# Patient Record
Sex: MALE | Race: WHITE | NOT HISPANIC OR LATINO | URBAN - METROPOLITAN AREA
[De-identification: names, ages, dates, MRNs, and addresses within clinical notes are randomized per-mention and may not be internally consistent; named-entity substitution may affect disease eponyms.]

---

## 2017-02-20 ENCOUNTER — OUTPATIENT (OUTPATIENT)
Dept: OUTPATIENT SERVICES | Facility: HOSPITAL | Age: 77
LOS: 1 days | End: 2017-02-20

## 2017-02-21 ENCOUNTER — OUTPATIENT (OUTPATIENT)
Dept: OUTPATIENT SERVICES | Facility: HOSPITAL | Age: 77
LOS: 1 days | End: 2017-02-21

## 2017-02-22 ENCOUNTER — OUTPATIENT (OUTPATIENT)
Dept: OUTPATIENT SERVICES | Facility: HOSPITAL | Age: 77
LOS: 1 days | End: 2017-02-22
Payer: MEDICARE

## 2017-02-22 PROCEDURE — 93971 EXTREMITY STUDY: CPT | Mod: 26,LT

## 2017-02-22 PROCEDURE — 71275 CT ANGIOGRAPHY CHEST: CPT | Mod: 26

## 2017-02-27 ENCOUNTER — APPOINTMENT (OUTPATIENT)
Dept: CARDIOLOGY | Facility: CLINIC | Age: 77
End: 2017-02-27

## 2017-02-27 PROBLEM — Z00.00 ENCOUNTER FOR PREVENTIVE HEALTH EXAMINATION: Status: ACTIVE | Noted: 2017-02-27

## 2017-03-20 ENCOUNTER — APPOINTMENT (OUTPATIENT)
Dept: CARDIOLOGY | Facility: CLINIC | Age: 77
End: 2017-03-20

## 2017-03-27 ENCOUNTER — APPOINTMENT (OUTPATIENT)
Dept: CARDIOLOGY | Facility: CLINIC | Age: 77
End: 2017-03-27

## 2017-03-28 ENCOUNTER — OUTPATIENT (OUTPATIENT)
Dept: OUTPATIENT SERVICES | Facility: HOSPITAL | Age: 77
LOS: 1 days | End: 2017-03-28

## 2017-06-01 ENCOUNTER — OUTPATIENT (OUTPATIENT)
Dept: OUTPATIENT SERVICES | Facility: HOSPITAL | Age: 77
LOS: 1 days | End: 2017-06-01

## 2017-09-26 ENCOUNTER — OUTPATIENT (OUTPATIENT)
Dept: OUTPATIENT SERVICES | Facility: HOSPITAL | Age: 77
LOS: 1 days | End: 2017-09-26

## 2017-10-02 ENCOUNTER — APPOINTMENT (OUTPATIENT)
Dept: CARDIOLOGY | Facility: CLINIC | Age: 77
End: 2017-10-02
Payer: MEDICARE

## 2017-10-02 PROCEDURE — 99214 OFFICE O/P EST MOD 30 MIN: CPT

## 2018-01-03 ENCOUNTER — OUTPATIENT (OUTPATIENT)
Dept: OUTPATIENT SERVICES | Facility: HOSPITAL | Age: 78
LOS: 1 days | End: 2018-01-03

## 2018-03-26 ENCOUNTER — OUTPATIENT (OUTPATIENT)
Dept: OUTPATIENT SERVICES | Facility: HOSPITAL | Age: 78
LOS: 1 days | End: 2018-03-26

## 2018-05-07 ENCOUNTER — OUTPATIENT (OUTPATIENT)
Dept: OUTPATIENT SERVICES | Facility: HOSPITAL | Age: 78
LOS: 1 days | End: 2018-05-07

## 2018-05-15 ENCOUNTER — RECORD ABSTRACTING (OUTPATIENT)
Age: 78
End: 2018-05-15

## 2018-05-21 ENCOUNTER — APPOINTMENT (OUTPATIENT)
Dept: CARDIOLOGY | Facility: CLINIC | Age: 78
End: 2018-05-21
Payer: MEDICARE

## 2018-05-21 ENCOUNTER — NON-APPOINTMENT (OUTPATIENT)
Age: 78
End: 2018-05-21

## 2018-05-21 VITALS
SYSTOLIC BLOOD PRESSURE: 120 MMHG | DIASTOLIC BLOOD PRESSURE: 68 MMHG | BODY MASS INDEX: 29.35 KG/M2 | HEIGHT: 67 IN | HEART RATE: 52 BPM | WEIGHT: 187 LBS | OXYGEN SATURATION: 97 %

## 2018-05-21 DIAGNOSIS — Z86.711 PERSONAL HISTORY OF PULMONARY EMBOLISM: ICD-10-CM

## 2018-05-21 DIAGNOSIS — Z82.3 FAMILY HISTORY OF STROKE: ICD-10-CM

## 2018-05-21 DIAGNOSIS — Z78.9 OTHER SPECIFIED HEALTH STATUS: ICD-10-CM

## 2018-05-21 DIAGNOSIS — Z87.898 PERSONAL HISTORY OF OTHER SPECIFIED CONDITIONS: ICD-10-CM

## 2018-05-21 DIAGNOSIS — Z82.49 FAMILY HISTORY OF ISCHEMIC HEART DISEASE AND OTHER DISEASES OF THE CIRCULATORY SYSTEM: ICD-10-CM

## 2018-05-21 DIAGNOSIS — E03.9 HYPOTHYROIDISM, UNSPECIFIED: ICD-10-CM

## 2018-05-21 DIAGNOSIS — Z87.19 PERSONAL HISTORY OF OTHER DISEASES OF THE DIGESTIVE SYSTEM: ICD-10-CM

## 2018-05-21 PROCEDURE — 93000 ELECTROCARDIOGRAM COMPLETE: CPT

## 2018-05-21 PROCEDURE — 99214 OFFICE O/P EST MOD 30 MIN: CPT

## 2018-05-21 RX ORDER — MENTHOL/CAMPHOR 0.5 %-0.5%
1000 LOTION (ML) TOPICAL
Refills: 0 | Status: ACTIVE | COMMUNITY

## 2018-06-14 ENCOUNTER — OUTPATIENT (OUTPATIENT)
Dept: OUTPATIENT SERVICES | Facility: HOSPITAL | Age: 78
LOS: 1 days | End: 2018-06-14

## 2018-06-27 ENCOUNTER — OUTPATIENT (OUTPATIENT)
Dept: OUTPATIENT SERVICES | Facility: HOSPITAL | Age: 78
LOS: 1 days | End: 2018-06-27

## 2018-07-19 ENCOUNTER — OUTPATIENT (OUTPATIENT)
Dept: OUTPATIENT SERVICES | Facility: HOSPITAL | Age: 78
LOS: 1 days | End: 2018-07-19

## 2018-07-24 ENCOUNTER — APPOINTMENT (OUTPATIENT)
Dept: CARDIOLOGY | Facility: CLINIC | Age: 78
End: 2018-07-24
Payer: MEDICARE

## 2018-07-24 PROCEDURE — 93979 VASCULAR STUDY: CPT

## 2018-07-24 PROCEDURE — 93306 TTE W/DOPPLER COMPLETE: CPT

## 2018-10-18 ENCOUNTER — OUTPATIENT (OUTPATIENT)
Dept: OUTPATIENT SERVICES | Facility: HOSPITAL | Age: 78
LOS: 1 days | End: 2018-10-18

## 2018-11-26 ENCOUNTER — APPOINTMENT (OUTPATIENT)
Dept: CARDIOLOGY | Facility: CLINIC | Age: 78
End: 2018-11-26
Payer: MEDICARE

## 2018-11-26 VITALS
SYSTOLIC BLOOD PRESSURE: 112 MMHG | DIASTOLIC BLOOD PRESSURE: 60 MMHG | HEIGHT: 67 IN | OXYGEN SATURATION: 98 % | WEIGHT: 182 LBS | HEART RATE: 62 BPM | BODY MASS INDEX: 28.56 KG/M2

## 2018-11-26 PROCEDURE — 99214 OFFICE O/P EST MOD 30 MIN: CPT

## 2018-11-26 RX ORDER — FINASTERIDE 5 MG/1
5 TABLET, FILM COATED ORAL DAILY
Refills: 0 | Status: ACTIVE | COMMUNITY

## 2018-12-27 ENCOUNTER — OUTPATIENT (OUTPATIENT)
Dept: OUTPATIENT SERVICES | Facility: HOSPITAL | Age: 78
LOS: 1 days | End: 2018-12-27

## 2019-04-10 ENCOUNTER — OUTPATIENT (OUTPATIENT)
Dept: OUTPATIENT SERVICES | Facility: HOSPITAL | Age: 79
LOS: 1 days | End: 2019-04-10

## 2019-05-16 ENCOUNTER — OUTPATIENT (OUTPATIENT)
Dept: OUTPATIENT SERVICES | Facility: HOSPITAL | Age: 79
LOS: 1 days | End: 2019-05-16

## 2019-07-15 ENCOUNTER — NON-APPOINTMENT (OUTPATIENT)
Age: 79
End: 2019-07-15

## 2019-07-15 ENCOUNTER — APPOINTMENT (OUTPATIENT)
Dept: CARDIOLOGY | Facility: CLINIC | Age: 79
End: 2019-07-15
Payer: MEDICARE

## 2019-07-15 VITALS
BODY MASS INDEX: 28.09 KG/M2 | HEART RATE: 57 BPM | OXYGEN SATURATION: 97 % | HEIGHT: 67 IN | SYSTOLIC BLOOD PRESSURE: 112 MMHG | WEIGHT: 179 LBS | DIASTOLIC BLOOD PRESSURE: 70 MMHG

## 2019-07-15 PROCEDURE — 93000 ELECTROCARDIOGRAM COMPLETE: CPT

## 2019-07-15 PROCEDURE — 99213 OFFICE O/P EST LOW 20 MIN: CPT

## 2019-07-15 RX ORDER — LEVOTHYROXINE SODIUM 88 UG/1
88 TABLET ORAL DAILY
Refills: 0 | Status: DISCONTINUED | COMMUNITY
End: 2019-07-15

## 2019-07-15 RX ORDER — DILTIAZEM HYDROCHLORIDE 120 MG/1
120 TABLET ORAL
Qty: 180 | Refills: 1 | Status: DISCONTINUED | COMMUNITY
End: 2019-07-15

## 2019-07-15 NOTE — PHYSICAL EXAM
[General Appearance - Well Developed] : well developed [Normal Appearance] : normal appearance [Well Groomed] : well groomed [General Appearance - Well Nourished] : well nourished [No Deformities] : no deformities [General Appearance - In No Acute Distress] : no acute distress [Normal Conjunctiva] : the conjunctiva exhibited no abnormalities [Eyelids - No Xanthelasma] : the eyelids demonstrated no xanthelasmas [Normal Oral Mucosa] : normal oral mucosa [No Oral Pallor] : no oral pallor [No Oral Cyanosis] : no oral cyanosis [Normal Jugular Venous A Waves Present] : normal jugular venous A waves present [Normal Jugular Venous V Waves Present] : normal jugular venous V waves present [No Jugular Venous Oliveira A Waves] : no jugular venous oliveira A waves [Respiration, Rhythm And Depth] : normal respiratory rhythm and effort [Exaggerated Use Of Accessory Muscles For Inspiration] : no accessory muscle use [Auscultation Breath Sounds / Voice Sounds] : lungs were clear to auscultation bilaterally [Heart Rate And Rhythm] : heart rate and rhythm were normal [Heart Sounds] : normal S1 and S2 [Abdomen Soft] : soft [Abdomen Tenderness] : non-tender [Abdomen Mass (___ Cm)] : no abdominal mass palpated [Abnormal Walk] : normal gait [Gait - Sufficient For Exercise Testing] : the gait was sufficient for exercise testing [Nail Clubbing] : no clubbing of the fingernails [Cyanosis, Localized] : no localized cyanosis [Petechial Hemorrhages (___cm)] : no petechial hemorrhages [Skin Color & Pigmentation] : normal skin color and pigmentation [] : no rash [No Venous Stasis] : no venous stasis [Skin Lesions] : no skin lesions [No Skin Ulcers] : no skin ulcer [No Xanthoma] : no  xanthoma was observed [Oriented To Time, Place, And Person] : oriented to person, place, and time [Affect] : the affect was normal [Mood] : the mood was normal [No Anxiety] : not feeling anxious [FreeTextEntry1] : 2/6 TOBY LSB

## 2019-07-15 NOTE — ASSESSMENT
[FreeTextEntry1] : Sina is a 79-year-old male with medical history detailed above and active medical issues including:\par \par -Atypical chest pain resolved, no ischemia Myoview stress test March 2017\par \par -Bileaflet mitral valve prolapse, mild MR, moderate pulmonary hypertension \par \par -HTN at goal less than 130/80. Baseline bradycardia, continue diltiazem to 120mg  twice per day with followup home blood pressure and heart rate.\par \par -Dyslipidemia on simvastatin well tolerated\par \par - LBBB since 2005. \par \par -Pulmonary embolus 2016 completed Xarelto therapy\par \par -Family history of premature vascular disease\par \par Patient will be seen in cardiology followup 6 months. \par \par Patient will have 2-D echocardiogram to assess for LV systolic function, wall motion and structural heart disease. Abdominal ultrasound to assess for obstructive PAD.\par \par Advised patient to follow active lifestyle with regular cardiovascular exercise. Patient educated on lifestyle and diet modification with low sodium low fat diet and avoidance of excessive alcohol. Patient is aware to call with any symptoms or concerns. \par \par Repeat labs ordered. Current cardiac medications remain unchanged.\par \par Sina will followup with Dr Cindy Fong for primary care\par \par

## 2019-07-15 NOTE — REASON FOR VISIT
[Follow-Up - Clinic] : a clinic follow-up of [FreeTextEntry2] : LBBB, PE 2016 completed Xarelto, HTN, dyslipidemia [FreeTextEntry1] : Sina is a 78-year-old male with history of chest pain, nonobstructive cath 2008, HTN, dyslipidemia, pulmonary embolus completed Xarelto October 2016, BPH, family history of stroke.\par \par Cardiovascular review of symptoms is negative for exertional chest pain, dyspnea, palpitations, dizziness or syncope.  No PND or orthopnea leg edema.  No bleeding or black stool.\par \par No routine exercise. At times he exercises on a glider machine without chest pain. Patient is walking 10 minutes without exertional chest pain\par \par Patient had a cardiology evaluation while in [place] in 2008 for chest pain, catheterization nonobstructive.\par \par Patient was seen in [facility]ology evaluation in 2005, normal perfusion a Myoview stress test no ischemic EKG response, baseline NSR PRWP\par \par EKG sinus bradycardia 51 bpm, LBBB unchanged.  Diltiazem reduced to 120mg twice per day with followup home heart rate and blood pressure.  \par \par Lexascan Myoview stress test March 2017, LVEF 56%, small anterior apical fixed defect likely related to left bundle branch block, no ischemia, nondiagnostic EKG response Baseline sinus rhythm LBBB, no anginal symptoms\par \par 2-D echo 2017, LVEF of 60%, mild diastolic dysfunction, bileaflet mitral valve prolapse, mild MR and TR, moderate pulmonary hypertension, PASP 51 mmHg, ascending aorta 4.1 cm.\par \par Carotid ultrasound March 2017 nonobstructive\par \par Abdominal ultrasound March 2017 proximal aorta 3.0 cm\par

## 2019-07-15 NOTE — REASON FOR VISIT
[Follow-Up - Clinic] : a clinic follow-up of [Abnormal ECG] : an abnormal ECG [Hyperlipidemia] : hyperlipidemia [Hypertension] : hypertension [FreeTextEntry2] : RUBÉN, PE 2016 completed Xarelto [FreeTextEntry1] : Sina is a 79-year-old male with history of chest pain, nonobstructive cath 2008, HTN, dyslipidemia, nonobstructive catheterization 2008, pulmonary embolus completed Xarelto October 2016, BPH, family history of stroke.\par \par Cardiovascular review of symptoms is negative for exertional chest pain, dyspnea, palpitations, dizziness or syncope.  No PND or orthopnea leg edema.  No bleeding or black stool.\par \par No routine exercise. At times he exercises on a glider machine without chest pain. Patient is walking 10 minutes without exertional chest pain\par \par Patient had a cardiology evaluation while in [place] in 2008 for chest pain, catheterization nonobstructive.\par \par Patient was seen in [facility]ology evaluation in 2005, normal perfusion a Myoview stress test no ischemic EKG response, baseline NSR PRWP\par \par EKG sinus bradycardia 51 bpm, LBBB unchanged.  Diltiazem reduced to 120mg twice per day with followup home heart rate and blood pressure.  \par \par Lexascan Myoview stress test March 2017, LVEF 56%, small anterior apical fixed defect likely related to left bundle branch block, no ischemia, nondiagnostic EKG response Baseline sinus rhythm LBBB, no anginal symptoms\par \par 2-D echo 2017, LVEF of 60%, mild diastolic dysfunction, bileaflet mitral valve prolapse, mild MR and TR, moderate pulmonary hypertension, PASP 51 mmHg, ascending aorta 4.1 cm.\par \par Carotid ultrasound March 2017 nonobstructive\par \par Abdominal ultrasound March 2017 proximal aorta 3.0 cm\par

## 2019-07-15 NOTE — ASSESSMENT
[FreeTextEntry1] : Sina is a 78-year-old male with medical history detailed above and active medical issues including:\par \par -Atypical chest pain resolved, no ischemia Myoview stress test March 2017\par \par -LBBB since 2005. Baseline bradycardia, reduce diltiazem to 120mg  twice per day with followup home blood pressure and heart rate.\par \par -Bileaflet mitral valve prolapse, mild MR, moderate pulmonary hypertension \par \par -HTN at goal less than 130/80\par \par -Dyslipidemia on simvastatin well tolerated\par \par -Pulmonary embolus 2016 completed Xarelto therapy\par \par -Family history of premature vascular disease\par \par Advised patient to follow active lifestyle with regular cardiovascular exercise. Patient educated on lifestyle and diet modification with low sodium low fat diet and avoidance of excessive alcohol. Patient is aware to call with any symptoms or concerns.\par \par Cardiology followup 6 months. Current cardiac medications remain unchanged. Repeat labs will be ordered with PMD.\par \par Sina will followup with Dr Noah Bello for primary care\par \par

## 2019-07-23 ENCOUNTER — APPOINTMENT (OUTPATIENT)
Dept: NUCLEAR MEDICINE | Facility: CLINIC | Age: 79
End: 2019-07-23

## 2019-07-24 ENCOUNTER — APPOINTMENT (OUTPATIENT)
Dept: NUCLEAR MEDICINE | Facility: CLINIC | Age: 79
End: 2019-07-24

## 2019-07-29 ENCOUNTER — OUTPATIENT (OUTPATIENT)
Dept: OUTPATIENT SERVICES | Facility: HOSPITAL | Age: 79
LOS: 1 days | End: 2019-07-29

## 2019-10-29 ENCOUNTER — OUTPATIENT (OUTPATIENT)
Dept: OUTPATIENT SERVICES | Facility: HOSPITAL | Age: 79
LOS: 1 days | End: 2019-10-29

## 2020-01-27 ENCOUNTER — APPOINTMENT (OUTPATIENT)
Dept: CARDIOLOGY | Facility: CLINIC | Age: 80
End: 2020-01-27
Payer: MEDICARE

## 2020-01-27 VITALS
SYSTOLIC BLOOD PRESSURE: 124 MMHG | OXYGEN SATURATION: 98 % | WEIGHT: 178 LBS | HEIGHT: 67 IN | BODY MASS INDEX: 27.94 KG/M2 | DIASTOLIC BLOOD PRESSURE: 74 MMHG | HEART RATE: 50 BPM

## 2020-01-27 PROCEDURE — 99214 OFFICE O/P EST MOD 30 MIN: CPT

## 2020-01-27 RX ORDER — OMEPRAZOLE 20 MG/1
20 CAPSULE, DELAYED RELEASE ORAL
Refills: 0 | Status: DISCONTINUED | COMMUNITY
End: 2020-01-27

## 2020-01-27 RX ORDER — DILTIAZEM HYDROCHLORIDE 120 MG/1
120 CAPSULE, EXTENDED RELEASE ORAL TWICE DAILY
Refills: 1 | Status: DISCONTINUED | COMMUNITY
End: 2020-01-27

## 2020-01-27 NOTE — ASSESSMENT
[FreeTextEntry1] : Sina is a 79-year-old male with medical history detailed above and active medical issues including:\par \par - Patient has dyspnea with moderate exertion, abnormal EKG LBBB, multiple CAD risk factors.  Patient declines performing current stress test. \par \par -Bileaflet mitral valve prolapse, mild MR, moderate pulmonary hypertension, normal LVEF echo July 2018.   \par \par - Hypertension, BP at goal with baseline bradycardia heart rate 50 on diltiazem.  Change diltiazem to amlodipine 5 mg daily, continue losartan HCTZ and follow-up home BPs.  \par \par -Dyslipidemia on simvastatin well tolerated\par \par - LBBB since 2005. \par \par -Pulmonary embolus 2016 completed Xarelto therapy\par \par -Family history of premature vascular disease\par \par Advised patient to follow active lifestyle with regular cardiovascular exercise. Patient educated on lifestyle and diet modification with low sodium low fat diet and avoidance of excessive alcohol. Patient is aware to call with any symptoms or concerns. \par \par Patient will have 2-D echocardiogram to assess for  LV systolic function, wall motion and  structural heart disease.  Carotid and abdominal ultrasound to assess for obstructive PAD. Patient will be called with noninvasive testing results. Patient will be seen in cardiology follow-up 6 months.  Repeat labs ordered. \par \par Sina will followup with Dr Cindy Fong for primary care\par \par

## 2020-01-27 NOTE — REASON FOR VISIT
[Follow-Up - Clinic] : a clinic follow-up of [FreeTextEntry2] : LBBB, HTN, HL, PE 2016 completed Xarelto [FreeTextEntry1] : Sina is a 79-year-old male with history of  HTN, dyslipidemia,  atypical chest pain, nonobstructive catheterization 2008, pulmonary embolus completed Xarelto October 2016, BPH, family history of stroke.\par \par Patient has dyspnea with moderate exertion.  Cardiovascular review of symptoms is negative for exertional chest pain, palpitations, dizziness or syncope.  No PND or orthopnea leg edema.  No bleeding or black stool.\par \par No routine exercise. Patient is walking 10 minutes without exertional chest pain. \par \par Patient had a cardiology evaluation while in [place] in 2008 for chest pain, catheterization nonobstructive.\par \par Patient was seen in [facility]ology evaluation in 2005, normal perfusion a Myoview stress test no ischemic EKG response, baseline NSR PRWP\par \par EKG sinus bradycardia 51 bpm, LBBB unchanged.  Diltiazem reduced to 120mg twice per day with followup home heart rate and blood pressure.  \par \par Lexascan Myoview stress test March 2017, LVEF 56%, small anterior apical fixed defect likely related to left bundle branch block, no ischemia, nondiagnostic EKG response Baseline sinus rhythm LBBB, no anginal symptoms\par \par 2-D echo 2017, LVEF of 60%, mild diastolic dysfunction, bileaflet mitral valve prolapse, mild MR and TR, moderate pulmonary hypertension, PASP 51 mmHg, ascending aorta 4.1 cm.\par \par Carotid ultrasound March 2017 nonobstructive\par \par Abdominal ultrasound March 2017 proximal aorta 3.0 cm\par

## 2020-02-03 ENCOUNTER — OUTPATIENT (OUTPATIENT)
Dept: OUTPATIENT SERVICES | Facility: HOSPITAL | Age: 80
LOS: 1 days | End: 2020-02-03

## 2020-02-03 ENCOUNTER — APPOINTMENT (OUTPATIENT)
Dept: CARDIOLOGY | Facility: CLINIC | Age: 80
End: 2020-02-03
Payer: MEDICARE

## 2020-02-03 VITALS
HEART RATE: 57 BPM | WEIGHT: 180 LBS | OXYGEN SATURATION: 98 % | SYSTOLIC BLOOD PRESSURE: 146 MMHG | BODY MASS INDEX: 27.28 KG/M2 | HEIGHT: 68 IN | DIASTOLIC BLOOD PRESSURE: 70 MMHG

## 2020-02-03 VITALS — HEART RATE: 52 BPM | SYSTOLIC BLOOD PRESSURE: 138 MMHG | DIASTOLIC BLOOD PRESSURE: 70 MMHG

## 2020-02-03 PROCEDURE — 99214 OFFICE O/P EST MOD 30 MIN: CPT

## 2020-02-03 RX ORDER — AMLODIPINE BESYLATE 2.5 MG/1
2.5 TABLET ORAL DAILY
Qty: 90 | Refills: 1 | Status: DISCONTINUED | COMMUNITY
Start: 2020-01-27 | End: 2020-02-03

## 2020-02-03 NOTE — REASON FOR VISIT
[Follow-Up - Clinic] : a clinic follow-up of [Medication Management] : Medication management [Hypertension] : hypertension [FreeTextEntry1] : Sina is a 79-year-old male with history of  HTN, dyslipidemia,  atypical chest pain, nonobstructive catheterization 2008, pulmonary embolus completed Xarelto October 2016, BPH, family history of stroke.\par \par Patient has dyspnea with moderate exertion.  Cardiovascular review of symptoms is negative for exertional chest pain, palpitations, dizziness or syncope.  No PND or orthopnea leg edema.  No bleeding or black stool.\par \par Patient had a cardiology evaluation while in [place] in 2008 for chest pain, catheterization nonobstructive.\par \par EKG sinus bradycardia 51 bpm, LBBB unchanged.  Diltiazem reduced to 120mg twice per day with followup home heart rate and blood pressure.  At last visit pt was taken off dilt and placed on amlodipine d/t asx bradycardia. He did not tolerate the change and felt extremely lightheaded. He discontinued the amlodipine and restarted the dilt. Symptoms resolved. On his home log, BP readings are well controlled. HR 52-60. \par \par Past testing for reference: \par Lexascan Myoview stress test March 2017, LVEF 56%, small anterior apical fixed defect likely related to left bundle branch block, no ischemia, nondiagnostic EKG response Baseline sinus rhythm LBBB, no anginal symptoms\par \par 2-D echo 7/2018, LVEF of 60%, mild diastolic dysfunction, bileaflet mitral valve prolapse, mild MR, normal PASP\par \par Carotid ultrasound March 2017 nonobstructive\par \par Abdominal ultrasound March 2017 proximal aorta 3.0 cm\par

## 2020-02-03 NOTE — PHYSICAL EXAM
[Normal Appearance] : normal appearance [Well Groomed] : well groomed [General Appearance - Well Developed] : well developed [General Appearance - Well Nourished] : well nourished [No Deformities] : no deformities [General Appearance - In No Acute Distress] : no acute distress [Normal Conjunctiva] : the conjunctiva exhibited no abnormalities [No Oral Pallor] : no oral pallor [Eyelids - No Xanthelasma] : the eyelids demonstrated no xanthelasmas [No Oral Cyanosis] : no oral cyanosis [Normal Jugular Venous V Waves Present] : normal jugular venous V waves present [Normal Jugular Venous A Waves Present] : normal jugular venous A waves present [No Jugular Venous Oliveira A Waves] : no jugular venous oliveira A waves [Respiration, Rhythm And Depth] : normal respiratory rhythm and effort [Auscultation Breath Sounds / Voice Sounds] : lungs were clear to auscultation bilaterally [Exaggerated Use Of Accessory Muscles For Inspiration] : no accessory muscle use [Heart Rate And Rhythm] : heart rate and rhythm were normal [Heart Sounds] : normal S1 and S2 [Abdomen Soft] : soft [Abdomen Tenderness] : non-tender [Abdomen Mass (___ Cm)] : no abdominal mass palpated [Gait - Sufficient For Exercise Testing] : the gait was sufficient for exercise testing [Abnormal Walk] : normal gait [Petechial Hemorrhages (___cm)] : no petechial hemorrhages [Nail Clubbing] : no clubbing of the fingernails [Cyanosis, Localized] : no localized cyanosis [Skin Color & Pigmentation] : normal skin color and pigmentation [No Venous Stasis] : no venous stasis [] : no rash [Skin Lesions] : no skin lesions [No Skin Ulcers] : no skin ulcer [Oriented To Time, Place, And Person] : oriented to person, place, and time [No Xanthoma] : no  xanthoma was observed [Affect] : the affect was normal [Mood] : the mood was normal [No Anxiety] : not feeling anxious [FreeTextEntry1] : 2/6 TOBY LSB

## 2020-02-03 NOTE — ASSESSMENT
[FreeTextEntry1] : Sina is a 79-year-old male with medical history detailed above and active medical issues including:\par \par - Patient has dyspnea with moderate exertion, abnormal EKG LBBB, multiple CAD risk factors. No chest pain.  Patient declines performing current stress test. \par \par -Bileaflet mitral valve prolapse, mild MR, normal LVEF echo July 2018.   \par \par - Hypertension, BP at goal with baseline bradycardia heart rate 50 on diltiazem. He did not tolerate attempt in change to amlodipine well. Advised 24h monitor to r/o significant bradyarrhythmias/pauses while on dilt. I validated his home cuff today with BP and HR are accurate. Continue to monitor and continue losartan HCTZ.\par \par -Dyslipidemia on simvastatin well tolerated\par \par - LBBB since 2005. \par \par -Pulmonary embolus 2016 completed Xarelto therapy\par \par -Family history of premature vascular disease\par \par Plan as discussed with Dr. Valentino on 1/27/20 is for routine noninvasive testing in the coming weeks. He will call with results and pt will return in 6 months unless otherwise indicated. Any questions and concerns were addressed and resolved. \par \par Sina will followup with Dr Cindy Fong for primary care\par \par Sincerely,\par \par [place] Person\par Patients history, testing, and plan reviewed with supervising MD: Dr. Bob Perales \par \par

## 2020-02-03 NOTE — REVIEW OF SYSTEMS
[Dyspnea on exertion] : dyspnea during exertion [see HPI] : see HPI [Negative] : Heme/Lymph [Lower Ext Edema] : no extremity edema [Chest  Pressure] : no chest pressure [Chest Pain] : no chest pain [Palpitations] : no palpitations [Leg Claudication] : no intermittent leg claudication

## 2020-02-06 PROCEDURE — 93224 XTRNL ECG REC UP TO 48 HRS: CPT

## 2020-02-11 ENCOUNTER — APPOINTMENT (OUTPATIENT)
Dept: CARDIOLOGY | Facility: CLINIC | Age: 80
End: 2020-02-11
Payer: MEDICARE

## 2020-02-11 PROCEDURE — 93306 TTE W/DOPPLER COMPLETE: CPT

## 2020-02-11 PROCEDURE — 93979 VASCULAR STUDY: CPT

## 2020-02-11 PROCEDURE — 93880 EXTRACRANIAL BILAT STUDY: CPT

## 2020-02-17 RX ORDER — DILTIAZEM HYDROCHLORIDE 120 MG/1
120 CAPSULE, EXTENDED RELEASE ORAL
Qty: 180 | Refills: 0 | Status: DISCONTINUED | COMMUNITY
End: 2020-02-17

## 2020-02-18 RX ORDER — DILTIAZEM HYDROCHLORIDE 120 MG/1
120 CAPSULE, EXTENDED RELEASE ORAL
Qty: 90 | Refills: 1 | Status: DISCONTINUED | COMMUNITY
Start: 2020-02-17 | End: 2020-02-18

## 2020-07-06 ENCOUNTER — APPOINTMENT (OUTPATIENT)
Dept: CARDIOLOGY | Facility: CLINIC | Age: 80
End: 2020-07-06
Payer: MEDICARE

## 2020-07-06 ENCOUNTER — NON-APPOINTMENT (OUTPATIENT)
Age: 80
End: 2020-07-06

## 2020-07-06 VITALS
HEIGHT: 67 IN | WEIGHT: 180 LBS | OXYGEN SATURATION: 97 % | SYSTOLIC BLOOD PRESSURE: 120 MMHG | HEART RATE: 54 BPM | BODY MASS INDEX: 28.25 KG/M2 | DIASTOLIC BLOOD PRESSURE: 70 MMHG | TEMPERATURE: 98.3 F

## 2020-07-06 PROCEDURE — 99214 OFFICE O/P EST MOD 30 MIN: CPT

## 2020-07-06 PROCEDURE — 93000 ELECTROCARDIOGRAM COMPLETE: CPT

## 2020-07-06 NOTE — ASSESSMENT
[FreeTextEntry1] : Sina is a 80-year-old male with medical history detailed above and active medical issues including:\par \par - Patient has dyspnea with moderate exertion, abnormal EKG LBBB, multiple CAD risk factors.  In the setting of Covid 19 pandemic we will discuss the need for stress testing next office visit. \par \par - Bileaflet mitral valve prolapse, mild MR, moderate pulmonary hypertension, normal LVEF echo July 2018.   \par \par - Hypertension, BP at goal with baseline bradycardia heart rate 50 on diltiazem.  Change diltiazem to amlodipine 5 mg daily, continue losartan HCTZ and follow-up home BPs.  \par \par - Dyslipidemia on simvastatin well tolerated\par \par - LBBB since 2005. \par \par - Pulmonary embolus 2016 completed Xarelto therapy\par \par - Family history of premature vascular disease\par \par Advised patient to follow active lifestyle with regular cardiovascular exercise. Patient educated on lifestyle and diet modification with low sodium low fat diet and avoidance of excessive alcohol. Patient is aware to call with any symptoms or concerns. \par \par Cardiology follow-up 6 months with 2-D echocardiogram to assess for  LV systolic function, wall motion and  structural heart disease.  Current cardiac medications remain unchanged. Repeat labs will be ordered with PMD.\par \par Sina will followup with Dr Cindy Fong for primary care\par \par

## 2020-07-06 NOTE — PHYSICAL EXAM
[General Appearance - Well Developed] : well developed [Normal Appearance] : normal appearance [Well Groomed] : well groomed [General Appearance - Well Nourished] : well nourished [No Deformities] : no deformities [General Appearance - In No Acute Distress] : no acute distress [Normal Conjunctiva] : the conjunctiva exhibited no abnormalities [Eyelids - No Xanthelasma] : the eyelids demonstrated no xanthelasmas [Normal Oral Mucosa] : normal oral mucosa [No Oral Pallor] : no oral pallor [No Oral Cyanosis] : no oral cyanosis [Normal Jugular Venous A Waves Present] : normal jugular venous A waves present [Normal Jugular Venous V Waves Present] : normal jugular venous V waves present [No Jugular Venous Oliveira A Waves] : no jugular venous oliveira A waves [Respiration, Rhythm And Depth] : normal respiratory rhythm and effort [Exaggerated Use Of Accessory Muscles For Inspiration] : no accessory muscle use [Auscultation Breath Sounds / Voice Sounds] : lungs were clear to auscultation bilaterally [Heart Rate And Rhythm] : heart rate and rhythm were normal [Heart Sounds] : normal S1 and S2 [Abdomen Soft] : soft [Abdomen Tenderness] : non-tender [Abdomen Mass (___ Cm)] : no abdominal mass palpated [Abnormal Walk] : normal gait [Gait - Sufficient For Exercise Testing] : the gait was sufficient for exercise testing [Nail Clubbing] : no clubbing of the fingernails [Cyanosis, Localized] : no localized cyanosis [Petechial Hemorrhages (___cm)] : no petechial hemorrhages [Skin Color & Pigmentation] : normal skin color and pigmentation [] : no rash [No Venous Stasis] : no venous stasis [Skin Lesions] : no skin lesions [No Skin Ulcers] : no skin ulcer [Oriented To Time, Place, And Person] : oriented to person, place, and time [No Xanthoma] : no  xanthoma was observed [Mood] : the mood was normal [No Anxiety] : not feeling anxious [Affect] : the affect was normal [FreeTextEntry1] : 2/6 TOBY LSB

## 2020-07-06 NOTE — REASON FOR VISIT
[Follow-Up - Clinic] : a clinic follow-up of [FreeTextEntry2] : LBBB, HTN, HL, PE 2016 completed Xarelto [FreeTextEntry1] : Sina is a 80-year-old male with history of  HTN, dyslipidemia, LBBB, atypical chest pain, nonobstructive catheterization 2008, pulmonary embolus completed Xarelto October 2016, BPH, family history of stroke.\par \par Patient has dyspnea with moderate exertion.  Cardiovascular review of symptoms is negative for exertional chest pain, palpitations, dizziness or syncope.  No PND or orthopnea leg edema.  No bleeding or black stool.\par \par No routine exercise. Patient is walking 15 minutes without exertional chest pain.  Patient is active gardening and mowing his lawn. \par \par Patient had a cardiology evaluation while in [place] in 2008 for chest pain, catheterization nonobstructive.\par \par Echocardiogram February 2020, LVEF 60%, mild diastolic dysfunction, bileaflet mitral valve prolapse with difficult to quantitate eccentric MR, mild TR, normal RVSP\par \par Carotid and abdominal ultrasound February 2020, mild nonobstructive plaque, normal abdominal aortic size\par \par Patient was seen in [facility]ology evaluation in 2005, normal perfusion a Myoview stress test no ischemic EKG response, baseline NSR PRWP\par \par EKG sinus bradycardia 51 bpm, LBBB unchanged.  Diltiazem reduced to 120mg twice per day with followup home heart rate and blood pressure.  \par \par Lexascan Myoview stress test March 2017, LVEF 56%, small anterior apical fixed defect likely related to left bundle branch block, no ischemia, nondiagnostic EKG response Baseline sinus rhythm LBBB, no anginal symptoms\par \par 2-D echo 2017, LVEF of 60%, mild diastolic dysfunction, bileaflet mitral valve prolapse, mild MR and TR, moderate pulmonary hypertension, PASP 51 mmHg, ascending aorta 4.1 cm.\par \par Carotid ultrasound March 2017 nonobstructive\par \par Abdominal ultrasound March 2017 proximal aorta 3.0 cm\par

## 2020-07-06 NOTE — REVIEW OF SYSTEMS
[Dyspnea on exertion] : dyspnea during exertion [Negative] : Heme/Lymph [Chest Pain] : no chest pain [Chest  Pressure] : no chest pressure [Lower Ext Edema] : no extremity edema [Leg Claudication] : no intermittent leg claudication [Palpitations] : no palpitations

## 2021-01-28 ENCOUNTER — OUTPATIENT (OUTPATIENT)
Dept: OUTPATIENT SERVICES | Facility: HOSPITAL | Age: 81
LOS: 1 days | End: 2021-01-28

## 2021-10-18 ENCOUNTER — NON-APPOINTMENT (OUTPATIENT)
Age: 81
End: 2021-10-18

## 2021-10-18 ENCOUNTER — APPOINTMENT (OUTPATIENT)
Dept: CARDIOLOGY | Facility: CLINIC | Age: 81
End: 2021-10-18
Payer: MEDICARE

## 2021-10-18 VITALS
WEIGHT: 178 LBS | HEART RATE: 57 BPM | DIASTOLIC BLOOD PRESSURE: 70 MMHG | SYSTOLIC BLOOD PRESSURE: 122 MMHG | HEIGHT: 67 IN | TEMPERATURE: 98.2 F | OXYGEN SATURATION: 98 % | BODY MASS INDEX: 27.94 KG/M2

## 2021-10-18 PROCEDURE — 99214 OFFICE O/P EST MOD 30 MIN: CPT

## 2021-10-18 PROCEDURE — 93000 ELECTROCARDIOGRAM COMPLETE: CPT

## 2021-10-18 RX ORDER — LEVOTHYROXINE SODIUM 0.09 MG/1
88 TABLET ORAL DAILY
Refills: 0 | Status: DISCONTINUED | COMMUNITY
End: 2021-10-18

## 2021-10-18 RX ORDER — LEVOTHYROXINE SODIUM 0.1 MG/1
100 TABLET ORAL DAILY
Refills: 0 | Status: ACTIVE | COMMUNITY

## 2021-10-18 NOTE — PHYSICAL EXAM
[General Appearance - Well Developed] : well developed [Normal Appearance] : normal appearance [Well Groomed] : well groomed [General Appearance - Well Nourished] : well nourished [No Deformities] : no deformities [General Appearance - In No Acute Distress] : no acute distress [Eyelids - No Xanthelasma] : the eyelids demonstrated no xanthelasmas [Normal Oral Mucosa] : normal oral mucosa [No Oral Pallor] : no oral pallor [No Oral Cyanosis] : no oral cyanosis [Normal Jugular Venous A Waves Present] : normal jugular venous A waves present [Normal Jugular Venous V Waves Present] : normal jugular venous V waves present [No Jugular Venous Oliveira A Waves] : no jugular venous oliveira A waves [Respiration, Rhythm And Depth] : normal respiratory rhythm and effort [Exaggerated Use Of Accessory Muscles For Inspiration] : no accessory muscle use [Auscultation Breath Sounds / Voice Sounds] : lungs were clear to auscultation bilaterally [Heart Rate And Rhythm] : heart rate and rhythm were normal [Heart Sounds] : normal S1 and S2 [Abdomen Soft] : soft [Abdomen Tenderness] : non-tender [Abdomen Mass (___ Cm)] : no abdominal mass palpated [Abnormal Walk] : normal gait [Gait - Sufficient For Exercise Testing] : the gait was sufficient for exercise testing [Nail Clubbing] : no clubbing of the fingernails [Cyanosis, Localized] : no localized cyanosis [Petechial Hemorrhages (___cm)] : no petechial hemorrhages [Skin Color & Pigmentation] : normal skin color and pigmentation [] : no rash [No Venous Stasis] : no venous stasis [Skin Lesions] : no skin lesions [No Skin Ulcers] : no skin ulcer [No Xanthoma] : no  xanthoma was observed [Oriented To Time, Place, And Person] : oriented to person, place, and time [Affect] : the affect was normal [Mood] : the mood was normal [No Anxiety] : not feeling anxious [Well Developed] : well developed [Well Nourished] : well nourished [No Acute Distress] : no acute distress [Normal Conjunctiva] : normal conjunctiva [Normal Venous Pressure] : normal venous pressure [No Carotid Bruit] : no carotid bruit [Normal S1, S2] : normal S1, S2 [No Rub] : no rub [No Gallop] : no gallop [Clear Lung Fields] : clear lung fields [Good Air Entry] : good air entry [No Respiratory Distress] : no respiratory distress  [Soft] : abdomen soft [Non Tender] : non-tender [No Masses/organomegaly] : no masses/organomegaly [Normal Bowel Sounds] : normal bowel sounds [Normal Gait] : normal gait [No Edema] : no edema [No Cyanosis] : no cyanosis [No Clubbing] : no clubbing [No Varicosities] : no varicosities [No Rash] : no rash [No Skin Lesions] : no skin lesions [Moves all extremities] : moves all extremities [No Focal Deficits] : no focal deficits [Normal Speech] : normal speech [Alert and Oriented] : alert and oriented [Normal memory] : normal memory [de-identified] : 2/6 TOBY of MR [FreeTextEntry1] : 2/6 TOBY LSB

## 2021-10-18 NOTE — REASON FOR VISIT
[Follow-Up - Clinic] : a clinic follow-up of [Other: ____] : [unfilled] [FreeTextEntry1] : Sina is a 81-year-old male with history of  HTN, dyslipidemia, LBBB, atypical chest pain, nonobstructive catheterization 2008, pulmonary embolus completed Xarelto October 2016, BPH, family history of stroke.\par \par Patient has dyspnea with moderate exertion.  Cardiovascular review of symptoms is negative for exertional chest pain, palpitations, dizziness or syncope.  No PND or orthopnea leg edema.  No bleeding or black stool.\par \par No routine exercise. Patient is walking 15 minutes without exertional chest pain.  Patient is active gardening and mowing his lawn.  Patient declines performing a current stress test.\par \par Patient had a cardiology evaluation while in [place] in 2008 for chest pain, catheterization nonobstructive.\par \par Echocardiogram February 2020, LVEF 60%, mild diastolic dysfunction, bileaflet mitral valve prolapse with difficult to quantitate eccentric MR, mild TR, normal RVSP\par \par Carotid and abdominal ultrasound February 2020, mild nonobstructive plaque, normal abdominal aortic size\par \par Patient was seen in [facility] evaluation in [facility], normal perfusion a Myoview stress test no ischemic EKG response, baseline NSR PRWP\par \par EKG sinus bradycardia 51 bpm, LBBB unchanged.  Diltiazem reduced to 120mg twice per day with followup home heart rate and blood pressure.  \par \par Lexascan Myoview stress test March 2017, LVEF 56%, small anterior apical fixed defect likely related to left bundle branch block, no ischemia, nondiagnostic EKG response Baseline sinus rhythm LBBB, no anginal symptoms\par \par 2-D echo 2017, LVEF of 60%, mild diastolic dysfunction, bileaflet mitral valve prolapse, mild MR and TR, moderate pulmonary hypertension, PASP 51 mmHg, ascending aorta 4.1 cm.\par \par Carotid ultrasound March 2017 nonobstructive\par \par Abdominal ultrasound March 2017 proximal aorta 3.0 cm\par  [FreeTextEntry2] : LBBB, HTN, HL, PE 2016 completed Xarelto

## 2021-11-22 ENCOUNTER — APPOINTMENT (OUTPATIENT)
Dept: CARDIOLOGY | Facility: CLINIC | Age: 81
End: 2021-11-22
Payer: MEDICARE

## 2021-11-22 PROCEDURE — 93306 TTE W/DOPPLER COMPLETE: CPT

## 2021-11-22 PROCEDURE — 93880 EXTRACRANIAL BILAT STUDY: CPT

## 2021-11-22 PROCEDURE — 93979 VASCULAR STUDY: CPT

## 2022-08-27 ENCOUNTER — TRANSCRIPTION ENCOUNTER (OUTPATIENT)
Age: 82
End: 2022-08-27

## 2022-08-29 ENCOUNTER — APPOINTMENT (OUTPATIENT)
Dept: DISASTER EMERGENCY | Facility: HOSPITAL | Age: 82
End: 2022-08-29

## 2022-08-29 ENCOUNTER — OUTPATIENT (OUTPATIENT)
Dept: OUTPATIENT SERVICES | Facility: HOSPITAL | Age: 82
LOS: 1 days | End: 2022-08-29

## 2022-08-29 VITALS
RESPIRATION RATE: 16 BRPM | DIASTOLIC BLOOD PRESSURE: 65 MMHG | HEART RATE: 77 BPM | SYSTOLIC BLOOD PRESSURE: 100 MMHG | OXYGEN SATURATION: 98 % | TEMPERATURE: 98 F

## 2022-08-29 VITALS
TEMPERATURE: 98 F | OXYGEN SATURATION: 97 % | SYSTOLIC BLOOD PRESSURE: 144 MMHG | HEIGHT: 67 IN | WEIGHT: 179.9 LBS | RESPIRATION RATE: 16 BRPM | DIASTOLIC BLOOD PRESSURE: 68 MMHG | HEART RATE: 74 BPM

## 2022-08-29 DIAGNOSIS — U07.1 COVID-19: ICD-10-CM

## 2022-08-29 RX ORDER — BEBTELOVIMAB 87.5 MG/ML
175 INJECTION, SOLUTION INTRAVENOUS ONCE
Refills: 0 | Status: COMPLETED | OUTPATIENT
Start: 2022-08-29 | End: 2022-08-29

## 2022-08-29 RX ADMIN — BEBTELOVIMAB 175 MILLIGRAM(S): 87.5 INJECTION, SOLUTION INTRAVENOUS at 08:55

## 2022-08-29 NOTE — CHART NOTE - NSCHARTNOTEFT_GEN_A_CORE
CC: Monoclonal Antibody Administration/COVID 19 Positive      History: Patient presents for administration of monoclonal antibody  Patient has been screened and was deemed to be a candidate.    Exposure: Daughter resides in home COVID +     Symptoms/ Criteria: fever sore throat cough     Inclusion Criteria: Pulmonary embolus age > 80 years  DM    Date of Positive Test: verified by clinical call center     Date of symptom onset: 8/26    Vaccine status:  Moderna x 4  last  6/22    PMHx:  Infection due to severe acute respiratory syndrome coronavirus 2 (SARS-CoV-2)          T(C): 36.7 (08-29-22 @ 09:08), Max: 36.7 (08-29-22 @ 09:08)  HR: 62 (08-29-22 @ 09:08) (62 - 74)  BP: 100/63 (08-29-22 @ 09:08) (100/63 - 144/68)  RR: 18 (08-29-22 @ 09:08) (16 - 18)  SpO2: 96% (08-29-22 @ 09:08) (96% - 97%)      PE:   Appearance: NAD	  HEENT:   Normal oral mucosa.   Lymphatic: No lymphadenopathy  Cardiovascular: Normal S1 S2, No JVD, No murmurs, No edema  Respiratory: Lungs clear to auscultation	  Gastrointestinal:  Soft, Non-tender. No guarding   Skin: warm and dry  Neurologic: Non-focal  Extremities: Normal range of motion.     ASSESSMENT:  Pt is a 82y year old Male with PMH  Pulmonary embolus HTN   DM   Covid +  referred to the infusion center for Monoclonal antibody administration  (Bebtelovimab).        PLAN:  - Administration procedure explained to patient   - Consent for monoclonal antibody administration obtained   - Risk & benefits discussed/all questions answered  - Administer Bebtelovimab per protocol  - will observe patient for one hour post administration  and then if stable discharge home with outpt follow up as planned by PMD.        - Patient tolerated treatment well denies complaints of chest pain/SOB/dizziness/ palpitations  - VSS for discharge home  - D/C instructions given/ fact sheet included.  - Patient to follow-up with PCP as needed.

## 2022-10-18 ENCOUNTER — APPOINTMENT (OUTPATIENT)
Dept: CARDIOLOGY | Facility: CLINIC | Age: 82
End: 2022-10-18

## 2022-10-18 ENCOUNTER — NON-APPOINTMENT (OUTPATIENT)
Age: 82
End: 2022-10-18

## 2022-10-18 VITALS
HEIGHT: 67 IN | HEART RATE: 60 BPM | OXYGEN SATURATION: 95 % | WEIGHT: 180 LBS | TEMPERATURE: 97.3 F | SYSTOLIC BLOOD PRESSURE: 124 MMHG | DIASTOLIC BLOOD PRESSURE: 68 MMHG | BODY MASS INDEX: 28.25 KG/M2

## 2022-10-18 PROCEDURE — 93000 ELECTROCARDIOGRAM COMPLETE: CPT

## 2022-10-18 PROCEDURE — 99215 OFFICE O/P EST HI 40 MIN: CPT

## 2022-10-18 NOTE — ASSESSMENT
[FreeTextEntry1] : Sina is a 80-year-old male with medical history detailed above and active medical issues including:\par \par - Recurrent chest pain concerning for angina, multiple CAD risk factors, coronary CTA and coronary calcium score ordered to access for obstructive CAD and risk stradification.\par \par - Bileaflet mitral valve prolapse, mild MR, moderate pulmonary hypertension, normal LVEF echo Nov 2021\par \par - Hypertension, average resting home BPs at guideline goal on diltiazem, losartan HCTZ.  \par \par - Dyslipidemia on simvastatin well tolerated\par \par - LBBB since 2005. \par \par - Pulmonary embolus 2016 completed Xarelto therapy\par \par - Family history of premature vascular disease\par \par Advised patient to follow active lifestyle with regular cardiovascular exercise. Patient educated on lifestyle and diet modification with low sodium low fat diet and avoidance of excessive alcohol. Patient is aware to call with any symptoms or concerns. \par \par Cardiology follow-up after noninvasive testing.  Current cardiac medications remain unchanged. Repeat labs will be ordered with PMD.\par \par Sina will followup with Dr Cindy Fong for primary care. \par \par

## 2022-10-18 NOTE — REASON FOR VISIT
[Other: ____] : [unfilled] [Follow-Up - Clinic] : a clinic follow-up of [FreeTextEntry1] : Sina is a 82-year-old male with history of  HTN, dyslipidemia, LBBB, atypical chest pain, nonobstructive catheterization 2008, pulmonary embolus completed Xarelto October 2016, BPH, family history of stroke.\par \par Patient has recurrent chest pain occurring at random times rest and exertion mild intensity nonradiating nonreproducible. Patient has dyspnea with moderate exertion.  Cardiovascular review of symptoms is negative for palpitations, dizziness or syncope.  No PND or orthopnea leg edema.  No bleeding or black stool.\par \par No routine exercise. Patient is walking 15 minutes on occasion.  Patient is active gardening and mowing his lawn.  \par \par Patient had a cardiology evaluation while in [place] in 2008 for chest pain, catheterization nonobstructive.\par \par Echocardiogram February 2020, LVEF 60%, mild diastolic dysfunction, bileaflet mitral valve prolapse with difficult to quantitate eccentric MR, mild TR, normal RVSP\par \par Carotid and abdominal ultrasound February 2020, mild nonobstructive plaque, normal abdominal aortic size\par \par Patient was seen in [facility] evaluation in 2005, normal perfusion a Myoview stress test no ischemic EKG response, baseline NSR PRWP\par \par EKG sinus bradycardia 51 bpm, LBBB unchanged.  Diltiazem reduced to 120mg twice per day with followup home heart rate and blood pressure.  \par \par Lexascan Myoview stress test March 2017, LVEF 56%, small anterior apical fixed defect likely related to left bundle branch block, no ischemia, nondiagnostic EKG response Baseline sinus rhythm LBBB, no anginal symptoms\par \par 2-D echo 2017, LVEF of 60%, mild diastolic dysfunction, bileaflet mitral valve prolapse, mild MR and TR, moderate pulmonary hypertension, PASP 51 mmHg, ascending aorta 4.1 cm.\par \par Carotid ultrasound March 2017 nonobstructive\par \par Abdominal ultrasound March 2017 proximal aorta 3.0 cm\par  [FreeTextEntry2] : LBBB, HTN, HL, PE 2016 completed Xarelto

## 2022-10-21 ENCOUNTER — APPOINTMENT (OUTPATIENT)
Dept: CARDIOLOGY | Facility: CLINIC | Age: 82
End: 2022-10-21

## 2022-10-21 PROCEDURE — 93306 TTE W/DOPPLER COMPLETE: CPT

## 2022-10-25 ENCOUNTER — NON-APPOINTMENT (OUTPATIENT)
Age: 82
End: 2022-10-25

## 2022-10-31 ENCOUNTER — APPOINTMENT (OUTPATIENT)
Dept: CARDIOLOGY | Facility: CLINIC | Age: 82
End: 2022-10-31

## 2022-10-31 PROCEDURE — 99214 OFFICE O/P EST MOD 30 MIN: CPT | Mod: 95

## 2022-10-31 NOTE — REASON FOR VISIT
[Other: ____] : [unfilled] [Follow-Up - Clinic] : a clinic follow-up of [FreeTextEntry1] : Sina is a 82-year-old male with history of  HTN, dyslipidemia, LBBB, atypical chest pain, nonobstructive catheterization 2008, pulmonary embolus completed Xarelto October 2016, BPH, family history of stroke.\par \par No further chest pain.  Patient had recurrent chest pain occurring at random times rest and exertion mild intensity nonradiating nonreproducible.  Cardiovascular review of symptoms is negative for exertional chest pain, dyspnea, palpitations, dizziness or syncope.  No PND or orthopnea leg edema.  No bleeding or black stool.\par \par No routine exercise. Patient is walking 15 minutes on occasion.  Patient is active gardening and mowing his lawn without exertional chest pain.  \par \par Patient had a cardiology evaluation while in [place] in 2008 for chest pain, catheterization nonobstructive.\par \par Coronary CTA and coronary calcium score Oct 2022, coronary calcium score total 640, , , , moderate CAD LAD 50%, RCA 30%, LVEF 55%\par \par Echocardiogram February 2020, LVEF 60%, mild diastolic dysfunction, bileaflet mitral valve prolapse with difficult to quantitate eccentric MR, mild TR, normal RVSP\par \par Carotid and abdominal ultrasound February 2020, mild nonobstructive plaque, normal abdominal aortic size\par \par Patient was seen in [facility] evaluation in 2005, normal perfusion a Myoview stress test no ischemic EKG response, baseline NSR PRWP\par \par EKG sinus bradycardia 51 bpm, LBBB unchanged.  Diltiazem reduced to 120mg twice per day with followup home heart rate and blood pressure.  \par \par Lexascan Myoview stress test March 2017, LVEF 56%, small anterior apical fixed defect likely related to left bundle branch block, no ischemia, nondiagnostic EKG response Baseline sinus rhythm LBBB, no anginal symptoms\par \par 2-D echo 2017, LVEF of 60%, mild diastolic dysfunction, bileaflet mitral valve prolapse, mild MR and TR, moderate pulmonary hypertension, PASP 51 mmHg, ascending aorta 4.1 cm.\par \par Carotid ultrasound March 2017 nonobstructive\par \par Abdominal ultrasound March 2017 proximal aorta 3.0 cm\par  [FreeTextEntry2] : LBBB, HTN, HL, PE 2016 completed Xarelto

## 2022-10-31 NOTE — PHYSICAL EXAM
[Well Developed] : well developed [Well Nourished] : well nourished [No Acute Distress] : no acute distress [Normal Venous Pressure] : normal venous pressure [No Carotid Bruit] : no carotid bruit [Normal S1, S2] : normal S1, S2 [No Rub] : no rub [No Gallop] : no gallop [Clear Lung Fields] : clear lung fields [Good Air Entry] : good air entry [No Respiratory Distress] : no respiratory distress  [Soft] : abdomen soft [Non Tender] : non-tender [No Masses/organomegaly] : no masses/organomegaly [Normal Bowel Sounds] : normal bowel sounds [Normal Gait] : normal gait [No Edema] : no edema [No Cyanosis] : no cyanosis [No Clubbing] : no clubbing [No Varicosities] : no varicosities [No Rash] : no rash [No Skin Lesions] : no skin lesions [Moves all extremities] : moves all extremities [No Focal Deficits] : no focal deficits [Normal Speech] : normal speech [Alert and Oriented] : alert and oriented [Normal memory] : normal memory [General Appearance - Well Developed] : well developed [Normal Appearance] : normal appearance [Well Groomed] : well groomed [General Appearance - Well Nourished] : well nourished [No Deformities] : no deformities [General Appearance - In No Acute Distress] : no acute distress [Normal Conjunctiva] : the conjunctiva exhibited no abnormalities [Eyelids - No Xanthelasma] : the eyelids demonstrated no xanthelasmas [Normal Oral Mucosa] : normal oral mucosa [No Oral Pallor] : no oral pallor [No Oral Cyanosis] : no oral cyanosis [Normal Jugular Venous A Waves Present] : normal jugular venous A waves present [Normal Jugular Venous V Waves Present] : normal jugular venous V waves present [No Jugular Venous Oliveira A Waves] : no jugular venous oliveira A waves [Respiration, Rhythm And Depth] : normal respiratory rhythm and effort [Exaggerated Use Of Accessory Muscles For Inspiration] : no accessory muscle use [Auscultation Breath Sounds / Voice Sounds] : lungs were clear to auscultation bilaterally [Heart Rate And Rhythm] : heart rate and rhythm were normal [Heart Sounds] : normal S1 and S2 [Abdomen Soft] : soft [Abdomen Tenderness] : non-tender [Abdomen Mass (___ Cm)] : no abdominal mass palpated [Abnormal Walk] : normal gait [Gait - Sufficient For Exercise Testing] : the gait was sufficient for exercise testing [Nail Clubbing] : no clubbing of the fingernails [Cyanosis, Localized] : no localized cyanosis [Petechial Hemorrhages (___cm)] : no petechial hemorrhages [Skin Color & Pigmentation] : normal skin color and pigmentation [] : no rash [No Venous Stasis] : no venous stasis [Skin Lesions] : no skin lesions [No Skin Ulcers] : no skin ulcer [No Xanthoma] : no  xanthoma was observed [Oriented To Time, Place, And Person] : oriented to person, place, and time [Affect] : the affect was normal [Mood] : the mood was normal [No Anxiety] : not feeling anxious [de-identified] : 2/6 TOBY of MR [FreeTextEntry1] : 2/6 TOBY LSB

## 2022-10-31 NOTE — ASSESSMENT
[FreeTextEntry1] : Sina is a 80-year-old male with medical history detailed above and active medical issues including:\par \par - Atypical chest pain resolved, risk coronary calcium score, moderate CAD LAD 50%, patient declines catheterization.  If persistent chest pain patient will call 911 and go to the nearest emergency room.  Cardiology follow-up 3 months,  continue aspirin and simvastatin.\par \par - Bileaflet mitral valve prolapse, mild MR, moderate pulmonary hypertension, normal LVEF echo Nov 2021\par \par - Hypertension, average resting home BPs at guideline goal on diltiazem, losartan HCTZ.  \par \par - Dyslipidemia on simvastatin well tolerated\par \par - LBBB since 2005. \par \par - Pulmonary embolus 2016 completed Xarelto therapy\par \par - Family history of premature vascular disease\par \par Advised patient to follow active lifestyle with regular cardiovascular exercise. Patient educated on lifestyle and diet modification with low sodium low fat diet and avoidance of excessive alcohol. Patient is aware to call with any symptoms or concerns. \par \par Cardiology follow-up 3 months.  Current cardiac medications remain unchanged. Repeat labs will be ordered with PMD.\par \par Sina will followup with Dr Cindy Fong for primary care. \par \par

## 2023-02-08 ENCOUNTER — RX ONLY (RX ONLY)
Age: 83
End: 2023-02-08

## 2023-02-08 ENCOUNTER — OFFICE (OUTPATIENT)
Dept: URBAN - METROPOLITAN AREA CLINIC 38 | Facility: CLINIC | Age: 83
Setting detail: OPHTHALMOLOGY
End: 2023-02-08
Payer: MEDICARE

## 2023-02-08 DIAGNOSIS — H43.393: ICD-10-CM

## 2023-02-08 DIAGNOSIS — H01.004: ICD-10-CM

## 2023-02-08 DIAGNOSIS — H25.13: ICD-10-CM

## 2023-02-08 DIAGNOSIS — D31.40: ICD-10-CM

## 2023-02-08 DIAGNOSIS — H43.813: ICD-10-CM

## 2023-02-08 DIAGNOSIS — H52.4: ICD-10-CM

## 2023-02-08 DIAGNOSIS — H16.223: ICD-10-CM

## 2023-02-08 DIAGNOSIS — Q14.1: ICD-10-CM

## 2023-02-08 DIAGNOSIS — H01.001: ICD-10-CM

## 2023-02-08 DIAGNOSIS — H11.151: ICD-10-CM

## 2023-02-08 PROCEDURE — 92014 COMPRE OPH EXAM EST PT 1/>: CPT | Performed by: OPHTHALMOLOGY

## 2023-02-08 PROCEDURE — 92015 DETERMINE REFRACTIVE STATE: CPT | Performed by: OPHTHALMOLOGY

## 2023-02-08 ASSESSMENT — REFRACTION_AUTOREFRACTION
OD_AXIS: 169
OS_SPHERE: +1.50
OD_CYLINDER: -0.50
OS_AXIS: 179
OS_CYLINDER: -2.25
OD_SPHERE: +2.00

## 2023-02-08 ASSESSMENT — REFRACTION_MANIFEST
OS_VA2: 20/30(J2)
OD_VA1: 20/30+
OS_VA1: 20/40-2
OD_VA2: 20/30(J2)
OS_ADD: +2.75
OS_SPHERE: +1.75
OS_AXIS: 180
OS_AXIS: 180
OD_ADD: +2.75
OS_CYLINDER: -0.75
OD_VA1: 20/30+
OD_ADD: +2.75
OD_SPHERE: +1.50
OS_VA1: 20/40-2
OU_VA: 20/30
OD_CYLINDER: SPHERE
OD_VA2: 20/30(J2)
OS_VA2: 20/30(J2)
OD_CYLINDER: SPHERE
OU_VA: 20/30
OS_ADD: +2.75
OD_SPHERE: +1.50
OS_SPHERE: +1.75
OS_CYLINDER: -0.75

## 2023-02-08 ASSESSMENT — REFRACTION_CURRENTRX
OS_CYLINDER: SPHERE
OD_CYLINDER: SPHERE
OS_VPRISM_DIRECTION: PROGS
OS_OVR_VA: 20/
OS_SPHERE: +1.50
OD_ADD: +3.00
OD_VPRISM_DIRECTION: PROGS
OS_ADD: +3.00
OD_SPHERE: +1.75
OD_OVR_VA: 20/

## 2023-02-08 ASSESSMENT — KERATOMETRY
OD_K1POWER_DIOPTERS: 41.25
OS_AXISANGLE_DEGREES: 089
OS_K1POWER_DIOPTERS: 40.75
OD_AXISANGLE_DEGREES: 086
OD_K2POWER_DIOPTERS: 42.50
METHOD_AUTO_MANUAL: AUTO
OS_K2POWER_DIOPTERS: 41.75

## 2023-02-08 ASSESSMENT — SPHEQUIV_DERIVED
OS_SPHEQUIV: 1.375
OD_SPHEQUIV: 1.75
OS_SPHEQUIV: 1.375
OS_SPHEQUIV: 0.375

## 2023-02-08 ASSESSMENT — TEAR BREAK UP TIME (TBUT)
OS_TBUT: 4 SEC
OD_TBUT: 5 SEC

## 2023-02-08 ASSESSMENT — LID EXAM ASSESSMENTS
OD_BLEPHARITIS: RUL 1+ 2+
OS_COMMENTS: DEMODEX
OS_BLEPHARITIS: LUL 2+
OD_COMMENTS: DEMODEX

## 2023-02-08 ASSESSMENT — VISUAL ACUITY
OD_BCVA: 20/50
OS_BCVA: 20/40-

## 2023-02-08 ASSESSMENT — CONFRONTATIONAL VISUAL FIELD TEST (CVF)
OD_FINDINGS: FULL
OS_FINDINGS: FULL

## 2023-02-08 ASSESSMENT — AXIALLENGTH_DERIVED
OS_AL: 23.8863
OS_AL: 24.2924
OD_AL: 23.5073
OS_AL: 23.8863

## 2023-02-08 ASSESSMENT — TONOMETRY
OD_IOP_MMHG: 15
OS_IOP_MMHG: 14

## 2023-03-06 RX ORDER — SIMVASTATIN 10 MG/1
10 TABLET, FILM COATED ORAL
Qty: 90 | Refills: 3 | Status: DISCONTINUED | COMMUNITY
Start: 2021-06-01 | End: 2023-03-06

## 2023-03-08 ENCOUNTER — NON-APPOINTMENT (OUTPATIENT)
Age: 83
End: 2023-03-08

## 2023-03-10 ENCOUNTER — APPOINTMENT (OUTPATIENT)
Dept: CARDIOLOGY | Facility: CLINIC | Age: 83
End: 2023-03-10
Payer: MEDICARE

## 2023-03-10 VITALS
SYSTOLIC BLOOD PRESSURE: 104 MMHG | WEIGHT: 176 LBS | BODY MASS INDEX: 27.62 KG/M2 | OXYGEN SATURATION: 97 % | DIASTOLIC BLOOD PRESSURE: 60 MMHG | HEART RATE: 63 BPM | HEIGHT: 67 IN | TEMPERATURE: 96.9 F

## 2023-03-10 PROCEDURE — 99215 OFFICE O/P EST HI 40 MIN: CPT

## 2023-03-10 RX ORDER — ASPIRIN 325 MG/1
325 TABLET, FILM COATED ORAL DAILY
Refills: 0 | Status: ACTIVE | COMMUNITY

## 2023-03-10 RX ORDER — ISOSORBIDE MONONITRATE 30 MG/1
30 TABLET, EXTENDED RELEASE ORAL DAILY
Refills: 0 | Status: DISCONTINUED | COMMUNITY
End: 2023-03-10

## 2023-03-10 NOTE — HISTORY OF PRESENT ILLNESS
[FreeTextEntry1] : \par 81 yo M here after [facility] hospitalization for atypical chest pain found to have non obstructive cad 50 ifr non significant. switched statin.  Was started on Imdur and reports headaches now.  Maintained aspirin 325 (discussed with hematology Dr. Hills for unprovoked PE while on aspirin 81). TFT also noted to be deranged (low T4 and TSH; decreased Synthroid to 100 from 150).\par Radial Access site well-healing.

## 2023-03-10 NOTE — DISCUSSION/SUMMARY
[FreeTextEntry1] : \par 81 yo M here after [facility] hospitalization for atypical chest pain found to have non obstructive cad 50 ifr non significant. switched statin and started on Imdur and reports headaches now. maintained aspirin 325 (discussed with hematology Dr. Hills for unprovoked PE while on aspirin 81). TFT also noted to be deranged (low T4 and TSH; decreased Synthroid to 100).\par \par I personally discussed with Dr. Hills on phone today today.  Patient will maintain on higher dose aspirin 325 for hematologic reasons.  Continue other optimal medical therapy including switched statin.  Patient will trial half tablet isosorbide for antianginal therapy currently is having headaches on 30 mg daily.  Unable to increase diltiazem or add beta-blocker given bradycardia follow with Dr. Valentino as scheduled.\par

## 2023-04-04 ENCOUNTER — NON-APPOINTMENT (OUTPATIENT)
Age: 83
End: 2023-04-04

## 2023-06-07 PROBLEM — R22.1 NECK MASS: Status: ACTIVE | Noted: 2021-12-14

## 2023-06-07 PROBLEM — K21.9 GERD (GASTROESOPHAGEAL REFLUX DISEASE): Status: ACTIVE | Noted: 2018-05-15

## 2023-06-14 ENCOUNTER — APPOINTMENT (OUTPATIENT)
Dept: CARDIOLOGY | Facility: CLINIC | Age: 83
End: 2023-06-14
Payer: MEDICARE

## 2023-06-14 VITALS
WEIGHT: 179 LBS | SYSTOLIC BLOOD PRESSURE: 120 MMHG | BODY MASS INDEX: 28.09 KG/M2 | HEART RATE: 71 BPM | OXYGEN SATURATION: 97 % | DIASTOLIC BLOOD PRESSURE: 62 MMHG | HEIGHT: 67 IN

## 2023-06-14 DIAGNOSIS — R22.1 LOCALIZED SWELLING, MASS AND LUMP, NECK: ICD-10-CM

## 2023-06-14 DIAGNOSIS — K21.9 GASTRO-ESOPHAGEAL REFLUX DISEASE W/OUT ESOPHAGITIS: ICD-10-CM

## 2023-06-14 PROCEDURE — 99215 OFFICE O/P EST HI 40 MIN: CPT

## 2023-06-14 NOTE — PHYSICAL EXAM
[Well Developed] : well developed [Well Nourished] : well nourished [No Acute Distress] : no acute distress [Normal Venous Pressure] : normal venous pressure [No Carotid Bruit] : no carotid bruit [Normal S1, S2] : normal S1, S2 [No Rub] : no rub [No Gallop] : no gallop [Clear Lung Fields] : clear lung fields [Good Air Entry] : good air entry [No Respiratory Distress] : no respiratory distress  [Soft] : abdomen soft [Non Tender] : non-tender [No Masses/organomegaly] : no masses/organomegaly [Normal Bowel Sounds] : normal bowel sounds [Normal Gait] : normal gait [No Edema] : no edema [No Cyanosis] : no cyanosis [No Clubbing] : no clubbing [No Varicosities] : no varicosities [No Rash] : no rash [No Skin Lesions] : no skin lesions [Moves all extremities] : moves all extremities [No Focal Deficits] : no focal deficits [Normal Speech] : normal speech [Alert and Oriented] : alert and oriented [Normal memory] : normal memory [General Appearance - Well Developed] : well developed [Normal Appearance] : normal appearance [Well Groomed] : well groomed [General Appearance - Well Nourished] : well nourished [No Deformities] : no deformities [General Appearance - In No Acute Distress] : no acute distress [Normal Conjunctiva] : the conjunctiva exhibited no abnormalities [Eyelids - No Xanthelasma] : the eyelids demonstrated no xanthelasmas [Normal Oral Mucosa] : normal oral mucosa [No Oral Pallor] : no oral pallor [No Oral Cyanosis] : no oral cyanosis [Normal Jugular Venous A Waves Present] : normal jugular venous A waves present [Normal Jugular Venous V Waves Present] : normal jugular venous V waves present [No Jugular Venous Oliveira A Waves] : no jugular venous oliveira A waves [Respiration, Rhythm And Depth] : normal respiratory rhythm and effort [Exaggerated Use Of Accessory Muscles For Inspiration] : no accessory muscle use [Auscultation Breath Sounds / Voice Sounds] : lungs were clear to auscultation bilaterally [Heart Rate And Rhythm] : heart rate and rhythm were normal [Heart Sounds] : normal S1 and S2 [Abdomen Soft] : soft [Abdomen Tenderness] : non-tender [Abdomen Mass (___ Cm)] : no abdominal mass palpated [Abnormal Walk] : normal gait [Gait - Sufficient For Exercise Testing] : the gait was sufficient for exercise testing [Nail Clubbing] : no clubbing of the fingernails [Cyanosis, Localized] : no localized cyanosis [Petechial Hemorrhages (___cm)] : no petechial hemorrhages [Skin Color & Pigmentation] : normal skin color and pigmentation [] : no rash [No Venous Stasis] : no venous stasis [Skin Lesions] : no skin lesions [No Skin Ulcers] : no skin ulcer [No Xanthoma] : no  xanthoma was observed [Oriented To Time, Place, And Person] : oriented to person, place, and time [Affect] : the affect was normal [Mood] : the mood was normal [No Anxiety] : not feeling anxious [de-identified] : 2/6 TOBY of MR [FreeTextEntry1] : 2/6 TOBY LSB

## 2023-06-14 NOTE — ASSESSMENT
[FreeTextEntry1] : Sina is a 83-year-old male with medical history detailed above and active medical issues including:\par \par - Atypical chest pain resolved, risk coronary calcium score, moderate CAD LAD 50%, moderate nonobstructive CAD with high risk coronary calcium score on CT Oct 2022, continue on aspirin, statin, Imdur\par \par - Bileaflet mitral valve prolapse, mild MR, moderate pulmonary hypertension, normal LVEF echo Oct 2022\par \par - Hypertension, average resting home BPs at guideline goal on diltiazem, losartan HCTZ.  \par \par - Dyslipidemia on simvastatin well tolerated\par \par - LBBB since 2005. \par \par - Pulmonary embolus 2016 completed Xarelto therapy\par \par - Family history of premature vascular disease\par \par Advised patient to follow active lifestyle with regular cardiovascular exercise. Patient educated on lifestyle and diet modification with low sodium low fat diet and avoidance of excessive alcohol. Patient is aware to call with any symptoms or concerns. \par \par Cardiology follow-up 3 months.  Current cardiac medications remain unchanged. Repeat labs will be ordered with PMD.\par \par Sina will followup with Dr Alfred Lake for primary care. \par \par

## 2023-06-14 NOTE — REASON FOR VISIT
[Other: ____] : [unfilled] [Follow-Up - Clinic] : a clinic follow-up of [FreeTextEntry1] : Sina is a 83-year-old male with history of  HTN, dyslipidemia, LBBB, atypical chest pain, nonobstructive catheterization 2008, pulmonary embolus completed Xarelto October 2016, BPH, family history of stroke.\par \par No further chest pain.  Patient had recurrent chest pain occurring at random times rest and exertion mild intensity nonradiating nonreproducible.  Cardiovascular review of symptoms is negative for exertional chest pain, dyspnea, palpitations, dizziness or syncope.  No PND or orthopnea leg edema.  No bleeding or black stool.\par \par No routine exercise. Patient is walking 15 minutes on occasion.  Patient is active gardening and mowing his lawn without exertional chest pain.  \par \par Patient had a cardiology evaluation while in [place] in 2008 for chest pain, catheterization nonobstructive.\par \par Coronary CTA and coronary calcium score Oct 2022, coronary calcium score total 640, , , , moderate CAD LAD 50%, RCA 30%, LVEF 55%\par \par Echocardiogram February 2020, LVEF 60%, mild diastolic dysfunction, bileaflet mitral valve prolapse with difficult to quantitate eccentric MR, mild TR, normal RVSP\par \par Carotid and abdominal ultrasound February 2020, mild nonobstructive plaque, normal abdominal aortic size\par \par Patient was seen in [facility] evaluation in 2005, normal perfusion a Myoview stress test no ischemic EKG response, baseline NSR PRWP\par \par EKG sinus bradycardia 51 bpm, LBBB unchanged.  Diltiazem reduced to 120mg twice per day with followup home heart rate and blood pressure.  \par \par Lexascan Myoview stress test March 2017, LVEF 56%, small anterior apical fixed defect likely related to left bundle branch block, no ischemia, nondiagnostic EKG response Baseline sinus rhythm LBBB, no anginal symptoms\par \par 2-D echo 2017, LVEF of 60%, mild diastolic dysfunction, bileaflet mitral valve prolapse, mild MR and TR, moderate pulmonary hypertension, PASP 51 mmHg, ascending aorta 4.1 cm.\par \par Carotid ultrasound March 2017 nonobstructive\par \par Abdominal ultrasound March 2017 proximal aorta 3.0 cm\par  [FreeTextEntry2] : LBBB, HTN, HL, PE 2016 completed Xarelto

## 2023-09-29 RX ORDER — DILTIAZEM HYDROCHLORIDE 120 MG/1
120 TABLET ORAL TWICE DAILY
Qty: 180 | Refills: 3 | Status: ACTIVE | COMMUNITY
Start: 2020-02-18 | End: 1900-01-01

## 2023-12-26 RX ORDER — ISOSORBIDE MONONITRATE 30 MG/1
30 TABLET, EXTENDED RELEASE ORAL
Qty: 90 | Refills: 3 | Status: ACTIVE | COMMUNITY
Start: 2023-03-10 | End: 1900-01-01

## 2024-01-03 PROBLEM — I71.40 AAA (ABDOMINAL AORTIC ANEURYSM): Status: ACTIVE | Noted: 2021-12-14

## 2024-01-08 ENCOUNTER — APPOINTMENT (OUTPATIENT)
Dept: CARDIOLOGY | Facility: CLINIC | Age: 84
End: 2024-01-08
Payer: MEDICARE

## 2024-01-08 VITALS
BODY MASS INDEX: 28.88 KG/M2 | HEART RATE: 53 BPM | WEIGHT: 184 LBS | DIASTOLIC BLOOD PRESSURE: 64 MMHG | HEIGHT: 67 IN | SYSTOLIC BLOOD PRESSURE: 110 MMHG | OXYGEN SATURATION: 95 %

## 2024-01-08 DIAGNOSIS — I44.7 LEFT BUNDLE-BRANCH BLOCK, UNSPECIFIED: ICD-10-CM

## 2024-01-08 DIAGNOSIS — I25.10 ATHEROSCLEROTIC HEART DISEASE OF NATIVE CORONARY ARTERY W/OUT ANGINA PECTORIS: ICD-10-CM

## 2024-01-08 DIAGNOSIS — E78.5 HYPERLIPIDEMIA, UNSPECIFIED: ICD-10-CM

## 2024-01-08 DIAGNOSIS — I71.40 ABDOMINAL AORTIC ANEURYSM, WITHOUT RUPTURE, UNSPECIFIED: ICD-10-CM

## 2024-01-08 DIAGNOSIS — R00.1 BRADYCARDIA, UNSPECIFIED: ICD-10-CM

## 2024-01-08 PROCEDURE — 99215 OFFICE O/P EST HI 40 MIN: CPT

## 2024-01-08 PROCEDURE — 93979 VASCULAR STUDY: CPT

## 2024-01-08 PROCEDURE — 93880 EXTRACRANIAL BILAT STUDY: CPT

## 2024-01-08 PROCEDURE — 93306 TTE W/DOPPLER COMPLETE: CPT

## 2024-01-08 PROCEDURE — 76376 3D RENDER W/INTRP POSTPROCES: CPT

## 2024-01-08 NOTE — REASON FOR VISIT
[Other: ____] : [unfilled] [Follow-Up - Clinic] : a clinic follow-up of [FreeTextEntry1] : Sina is a 83-year-old male with history of  HTN, dyslipidemia, LBBB, atypical chest pain, nonobstructive catheterization 2008, pulmonary embolus completed Xarelto October 2016, BPH, family history of stroke.  No further chest pain.  Patient had recurrent chest pain occurring at random times rest and exertion mild intensity nonradiating nonreproducible.  Cardiovascular review of symptoms is negative for exertional chest pain, dyspnea, palpitations, dizziness or syncope.  No PND or orthopnea leg edema.  No bleeding or black stool.  No routine exercise. Patient is walking 15 minutes on occasion.  Patient is active gardening and mowing his lawn without exertional chest pain.    Chronic catheterization March 2022, LAD 50% with normal IFR, medical therapy  Patient had a cardiology evaluation while in [place] in 2008 for chest pain, catheterization nonobstructive.  Coronary CTA and coronary calcium score Oct 2022, coronary calcium score total 640, , , , moderate CAD LAD 50%, RCA 30%, LVEF 55%  Echocardiogram February 2020, LVEF 60%, mild diastolic dysfunction, bileaflet mitral valve prolapse with difficult to quantitate eccentric MR, mild TR, normal RVSP  Carotid and abdominal ultrasound February 2020, mild nonobstructive plaque, normal abdominal aortic size  Patient was seen in [facility] evaluation in 2005, normal perfusion a Myoview stress test no ischemic EKG response, baseline NSR PRWP  EKG sinus bradycardia 51 bpm, LBBB unchanged.  Diltiazem reduced to 120mg twice per day with followup home heart rate and blood pressure.    Lexascan Myoview stress test March 2017, LVEF 56%, small anterior apical fixed defect likely related to left bundle branch block, no ischemia, nondiagnostic EKG response Baseline sinus rhythm LBBB, no anginal symptoms  2-D echo 2017, LVEF of 60%, mild diastolic dysfunction, bileaflet mitral valve prolapse, mild MR and TR, moderate pulmonary hypertension, PASP 51 mmHg, ascending aorta 4.1 cm.  Carotid ultrasound March 2017 nonobstructive  Abdominal ultrasound March 2017 proximal aorta 3.0 cm  [FreeTextEntry2] : LBBB, HTN, HL, PE 2016 completed Xarelto

## 2024-01-08 NOTE — DISCUSSION/SUMMARY
[FreeTextEntry1] : Sina is a 83-year-old male with medical history detailed above and active medical issues including:  - Atypical chest pain resolved, risk coronary calcium score, moderate CAD LAD 50%, moderate nonobstructive CAD with high risk coronary calcium score on CT Oct 2022, cath March 2022 LAD 50% with normal IFR, medical therapy, continue on aspirin, statin, Imdur  - Bileaflet mitral valve prolapse, mild MR, moderate pulmonary hypertension, normal LVEF echo Oct 2022  - Hypertension, average resting home BPs at guideline goal on diltiazem, losartan HCTZ.  - Dyslipidemia on simvastatin well tolerated  - LBBB since 2005.  - Pulmonary embolus 2016 completed Xarelto therapy  - Family history of premature vascular disease  Advised patient to follow active lifestyle with regular cardiovascular exercise. Patient educated on lifestyle and diet modification with low sodium low fat diet and avoidance of excessive alcohol. Patient is aware to call with any symptoms or concerns.  Cardiology follow-up 6 months with echocardiogram and Doppler studies.. Current cardiac medications remain unchanged. Repeat labs will be ordered with PMD.  Sina will followup with Dr Alfred Lake for primary care.  Total time spent 45 minutes, reviewing of test results, chart information, patient discussion, physical exam and completion of chart documentation.

## 2024-01-08 NOTE — PHYSICAL EXAM
[Well Developed] : well developed [Well Nourished] : well nourished [No Acute Distress] : no acute distress [Normal Venous Pressure] : normal venous pressure [No Carotid Bruit] : no carotid bruit [Normal S1, S2] : normal S1, S2 [No Rub] : no rub [No Gallop] : no gallop [Clear Lung Fields] : clear lung fields [Good Air Entry] : good air entry [No Respiratory Distress] : no respiratory distress  [Soft] : abdomen soft [Non Tender] : non-tender [No Masses/organomegaly] : no masses/organomegaly [Normal Bowel Sounds] : normal bowel sounds [Normal Gait] : normal gait [No Edema] : no edema [No Cyanosis] : no cyanosis [No Clubbing] : no clubbing [No Varicosities] : no varicosities [No Rash] : no rash [No Skin Lesions] : no skin lesions [Moves all extremities] : moves all extremities [No Focal Deficits] : no focal deficits [Normal Speech] : normal speech [Alert and Oriented] : alert and oriented [Normal memory] : normal memory [General Appearance - Well Developed] : well developed [Normal Appearance] : normal appearance [Well Groomed] : well groomed [General Appearance - Well Nourished] : well nourished [No Deformities] : no deformities [General Appearance - In No Acute Distress] : no acute distress [Normal Conjunctiva] : the conjunctiva exhibited no abnormalities [Eyelids - No Xanthelasma] : the eyelids demonstrated no xanthelasmas [Normal Oral Mucosa] : normal oral mucosa [No Oral Pallor] : no oral pallor [No Oral Cyanosis] : no oral cyanosis [Normal Jugular Venous A Waves Present] : normal jugular venous A waves present [Normal Jugular Venous V Waves Present] : normal jugular venous V waves present [No Jugular Venous Oliveira A Waves] : no jugular venous oliveira A waves [Respiration, Rhythm And Depth] : normal respiratory rhythm and effort [Exaggerated Use Of Accessory Muscles For Inspiration] : no accessory muscle use [Auscultation Breath Sounds / Voice Sounds] : lungs were clear to auscultation bilaterally [Heart Rate And Rhythm] : heart rate and rhythm were normal [Heart Sounds] : normal S1 and S2 [Abdomen Soft] : soft [Abdomen Tenderness] : non-tender [Abdomen Mass (___ Cm)] : no abdominal mass palpated [Abnormal Walk] : normal gait [Gait - Sufficient For Exercise Testing] : the gait was sufficient for exercise testing [Nail Clubbing] : no clubbing of the fingernails [Cyanosis, Localized] : no localized cyanosis [Petechial Hemorrhages (___cm)] : no petechial hemorrhages [Skin Color & Pigmentation] : normal skin color and pigmentation [] : no rash [No Venous Stasis] : no venous stasis [Skin Lesions] : no skin lesions [No Skin Ulcers] : no skin ulcer [No Xanthoma] : no  xanthoma was observed [Oriented To Time, Place, And Person] : oriented to person, place, and time [Affect] : the affect was normal [Mood] : the mood was normal [No Anxiety] : not feeling anxious [de-identified] : 2/6 TOBY of MR [FreeTextEntry1] : 2/6 TOBY LSB

## 2024-03-28 ENCOUNTER — NON-APPOINTMENT (OUTPATIENT)
Age: 84
End: 2024-03-28

## 2024-03-29 RX ORDER — ATORVASTATIN CALCIUM 40 MG/1
40 TABLET, FILM COATED ORAL
Qty: 90 | Refills: 1 | Status: ACTIVE | COMMUNITY
Start: 2024-03-29 | End: 1900-01-01

## 2024-06-01 ENCOUNTER — OFFICE (OUTPATIENT)
Dept: URBAN - METROPOLITAN AREA CLINIC 38 | Facility: CLINIC | Age: 84
Setting detail: OPHTHALMOLOGY
End: 2024-06-01
Payer: MEDICARE

## 2024-06-01 DIAGNOSIS — H25.13: ICD-10-CM

## 2024-06-01 DIAGNOSIS — D31.40: ICD-10-CM

## 2024-06-01 DIAGNOSIS — H43.393: ICD-10-CM

## 2024-06-01 DIAGNOSIS — H01.004: ICD-10-CM

## 2024-06-01 DIAGNOSIS — H43.813: ICD-10-CM

## 2024-06-01 DIAGNOSIS — H01.001: ICD-10-CM

## 2024-06-01 DIAGNOSIS — Q14.1: ICD-10-CM

## 2024-06-01 DIAGNOSIS — H52.4: ICD-10-CM

## 2024-06-01 PROCEDURE — 92250 FUNDUS PHOTOGRAPHY W/I&R: CPT | Performed by: OPHTHALMOLOGY

## 2024-06-01 PROCEDURE — 92015 DETERMINE REFRACTIVE STATE: CPT | Performed by: OPHTHALMOLOGY

## 2024-06-01 PROCEDURE — 92014 COMPRE OPH EXAM EST PT 1/>: CPT | Performed by: OPHTHALMOLOGY

## 2024-06-01 ASSESSMENT — LID EXAM ASSESSMENTS
OS_BLEPHARITIS: LUL 2+
OD_BLEPHARITIS: RUL 1+ 2+

## 2024-06-01 ASSESSMENT — CONFRONTATIONAL VISUAL FIELD TEST (CVF)
OS_FINDINGS: FULL
OD_FINDINGS: FULL

## 2024-06-25 ENCOUNTER — RX RENEWAL (OUTPATIENT)
Age: 84
End: 2024-06-25

## 2024-06-25 DIAGNOSIS — R07.9 CHEST PAIN, UNSPECIFIED: ICD-10-CM

## 2024-06-25 DIAGNOSIS — R94.31 ABNORMAL ELECTROCARDIOGRAM [ECG] [EKG]: ICD-10-CM

## 2024-06-25 DIAGNOSIS — I10 ESSENTIAL (PRIMARY) HYPERTENSION: ICD-10-CM

## 2024-06-25 RX ORDER — LOSARTAN POTASSIUM AND HYDROCHLOROTHIAZIDE 12.5; 5 MG/1; MG/1
50-12.5 TABLET ORAL
Qty: 90 | Refills: 3 | Status: ACTIVE | COMMUNITY
Start: 2021-07-26 | End: 1900-01-01

## 2024-07-29 ENCOUNTER — APPOINTMENT (OUTPATIENT)
Dept: CARDIOLOGY | Facility: CLINIC | Age: 84
End: 2024-07-29
Payer: MEDICARE

## 2024-07-29 VITALS
HEIGHT: 67 IN | BODY MASS INDEX: 27.47 KG/M2 | DIASTOLIC BLOOD PRESSURE: 66 MMHG | HEART RATE: 52 BPM | SYSTOLIC BLOOD PRESSURE: 124 MMHG | WEIGHT: 175 LBS | OXYGEN SATURATION: 97 %

## 2024-07-29 DIAGNOSIS — I44.7 LEFT BUNDLE-BRANCH BLOCK, UNSPECIFIED: ICD-10-CM

## 2024-07-29 DIAGNOSIS — I10 ESSENTIAL (PRIMARY) HYPERTENSION: ICD-10-CM

## 2024-07-29 DIAGNOSIS — I25.10 ATHEROSCLEROTIC HEART DISEASE OF NATIVE CORONARY ARTERY W/OUT ANGINA PECTORIS: ICD-10-CM

## 2024-07-29 DIAGNOSIS — R94.31 ABNORMAL ELECTROCARDIOGRAM [ECG] [EKG]: ICD-10-CM

## 2024-07-29 DIAGNOSIS — R07.9 CHEST PAIN, UNSPECIFIED: ICD-10-CM

## 2024-07-29 DIAGNOSIS — E78.5 HYPERLIPIDEMIA, UNSPECIFIED: ICD-10-CM

## 2024-07-29 DIAGNOSIS — R00.1 BRADYCARDIA, UNSPECIFIED: ICD-10-CM

## 2024-07-29 DIAGNOSIS — K21.9 GASTRO-ESOPHAGEAL REFLUX DISEASE W/OUT ESOPHAGITIS: ICD-10-CM

## 2024-07-29 DIAGNOSIS — I71.40 ABDOMINAL AORTIC ANEURYSM, WITHOUT RUPTURE, UNSPECIFIED: ICD-10-CM

## 2024-07-29 PROCEDURE — 93978 VASCULAR STUDY: CPT

## 2024-07-29 PROCEDURE — 93880 EXTRACRANIAL BILAT STUDY: CPT

## 2024-07-29 PROCEDURE — 76376 3D RENDER W/INTRP POSTPROCES: CPT

## 2024-07-29 PROCEDURE — 99215 OFFICE O/P EST HI 40 MIN: CPT

## 2024-07-29 PROCEDURE — G2211 COMPLEX E/M VISIT ADD ON: CPT

## 2024-07-29 PROCEDURE — 93306 TTE W/DOPPLER COMPLETE: CPT

## 2024-07-29 NOTE — REASON FOR VISIT
[Other: ____] : [unfilled] [Follow-Up - Clinic] : a clinic follow-up of [FreeTextEntry1] : Sina has a past medical history of  HTN, dyslipidemia, LBBB, atypical chest pain, nonobstructive catheterization 2008, pulmonary embolus completed Xarelto October 2016, BPH, family history of stroke.  No further chest pain.  Patient had recurrent chest pain occurring at random times rest and exertion mild intensity nonradiating nonreproducible.  Cardiovascular review of symptoms is negative for exertional chest pain, dyspnea, palpitations, dizziness or syncope.  No PND or orthopnea leg edema.  No bleeding or black stool.  No routine exercise. Patient is walking 15 minutes on occasion.  Patient is active gardening and mowing his lawn without exertional chest pain.    Echocardiogram July 2024 LVEF 60%, bileaflet mitral valve prolapse with mild to moderate MR, aortic root 4.0 cm  Carotid and abdominal ultrasound, mild nonobstructive plaque, normal abdominal aortic size.  Incidental finding right upper neck cyst versus lymph node, neck CT ordered  Cardiac catheterization March 2022, LAD 50% with normal IFR, medical therapy  Patient had a cardiology evaluation while in [place] in 2008 for chest pain, catheterization nonobstructive.  Coronary CTA and coronary calcium score Oct 2022, coronary calcium score total 640, , , , moderate CAD LAD 50%, RCA 30%, LVEF 55%  Echocardiogram February 2020, LVEF 60%, mild diastolic dysfunction, bileaflet mitral valve prolapse with difficult to quantitate eccentric MR, mild TR, normal RVSP  Carotid and abdominal ultrasound February 2020, mild nonobstructive plaque, normal abdominal aortic size  Patient was seen in [facility]ology evaluation in 2005, normal perfusion a Myoview stress test no ischemic EKG response, baseline NSR PRWP  EKG sinus bradycardia 51 bpm, LBBB unchanged.  Diltiazem reduced to 120mg twice per day with followup home heart rate and blood pressure.    Lexascan Myoview stress test March 2017, LVEF 56%, small anterior apical fixed defect likely related to left bundle branch block, no ischemia, nondiagnostic EKG response Baseline sinus rhythm LBBB, no anginal symptoms  2-D echo 2017, LVEF of 60%, mild diastolic dysfunction, bileaflet mitral valve prolapse, mild MR and TR, moderate pulmonary hypertension, PASP 51 mmHg, ascending aorta 4.1 cm.  Carotid ultrasound March 2017 nonobstructive  Abdominal ultrasound March 2017 proximal aorta 3.0 cm  [FreeTextEntry2] : LBBB, HTN, HL, PE 2016 completed Xarelto

## 2024-07-29 NOTE — PHYSICAL EXAM
[Well Developed] : well developed [Well Nourished] : well nourished [No Acute Distress] : no acute distress [Normal Venous Pressure] : normal venous pressure [No Carotid Bruit] : no carotid bruit [Normal S1, S2] : normal S1, S2 [No Rub] : no rub [No Gallop] : no gallop [Clear Lung Fields] : clear lung fields [Good Air Entry] : good air entry [No Respiratory Distress] : no respiratory distress  [Soft] : abdomen soft [Non Tender] : non-tender [No Masses/organomegaly] : no masses/organomegaly [Normal Bowel Sounds] : normal bowel sounds [Normal Gait] : normal gait [No Edema] : no edema [No Cyanosis] : no cyanosis [No Clubbing] : no clubbing [No Varicosities] : no varicosities [No Rash] : no rash [No Skin Lesions] : no skin lesions [Moves all extremities] : moves all extremities [No Focal Deficits] : no focal deficits [Normal Speech] : normal speech [Alert and Oriented] : alert and oriented [Normal memory] : normal memory [General Appearance - Well Developed] : well developed [Normal Appearance] : normal appearance [Well Groomed] : well groomed [General Appearance - Well Nourished] : well nourished [No Deformities] : no deformities [General Appearance - In No Acute Distress] : no acute distress [Normal Conjunctiva] : the conjunctiva exhibited no abnormalities [Eyelids - No Xanthelasma] : the eyelids demonstrated no xanthelasmas [Normal Oral Mucosa] : normal oral mucosa [No Oral Pallor] : no oral pallor [No Oral Cyanosis] : no oral cyanosis [Normal Jugular Venous A Waves Present] : normal jugular venous A waves present [Normal Jugular Venous V Waves Present] : normal jugular venous V waves present [No Jugular Venous Oliveira A Waves] : no jugular venous oliveira A waves [Respiration, Rhythm And Depth] : normal respiratory rhythm and effort [Exaggerated Use Of Accessory Muscles For Inspiration] : no accessory muscle use [Auscultation Breath Sounds / Voice Sounds] : lungs were clear to auscultation bilaterally [Heart Rate And Rhythm] : heart rate and rhythm were normal [Heart Sounds] : normal S1 and S2 [Abdomen Soft] : soft [Abdomen Tenderness] : non-tender [Abdomen Mass (___ Cm)] : no abdominal mass palpated [Abnormal Walk] : normal gait [Gait - Sufficient For Exercise Testing] : the gait was sufficient for exercise testing [Nail Clubbing] : no clubbing of the fingernails [Cyanosis, Localized] : no localized cyanosis [Petechial Hemorrhages (___cm)] : no petechial hemorrhages [Skin Color & Pigmentation] : normal skin color and pigmentation [] : no rash [No Venous Stasis] : no venous stasis [Skin Lesions] : no skin lesions [No Skin Ulcers] : no skin ulcer [No Xanthoma] : no  xanthoma was observed [Oriented To Time, Place, And Person] : oriented to person, place, and time [Affect] : the affect was normal [Mood] : the mood was normal [No Anxiety] : not feeling anxious [de-identified] : 2/6 TOBY of MR [FreeTextEntry1] : 2/6 TOBY LSB

## 2024-07-29 NOTE — PHYSICAL EXAM
[Well Developed] : well developed [Well Nourished] : well nourished [No Acute Distress] : no acute distress [Normal Venous Pressure] : normal venous pressure [No Carotid Bruit] : no carotid bruit [Normal S1, S2] : normal S1, S2 [No Rub] : no rub [No Gallop] : no gallop [Clear Lung Fields] : clear lung fields [Good Air Entry] : good air entry [No Respiratory Distress] : no respiratory distress  [Soft] : abdomen soft [Non Tender] : non-tender [No Masses/organomegaly] : no masses/organomegaly [Normal Bowel Sounds] : normal bowel sounds [Normal Gait] : normal gait [No Edema] : no edema [No Cyanosis] : no cyanosis [No Clubbing] : no clubbing [No Varicosities] : no varicosities [No Rash] : no rash [No Skin Lesions] : no skin lesions [Moves all extremities] : moves all extremities [No Focal Deficits] : no focal deficits [Normal Speech] : normal speech [Alert and Oriented] : alert and oriented [Normal memory] : normal memory [General Appearance - Well Developed] : well developed [Normal Appearance] : normal appearance [Well Groomed] : well groomed [General Appearance - Well Nourished] : well nourished [No Deformities] : no deformities [General Appearance - In No Acute Distress] : no acute distress [Normal Conjunctiva] : the conjunctiva exhibited no abnormalities [Eyelids - No Xanthelasma] : the eyelids demonstrated no xanthelasmas [Normal Oral Mucosa] : normal oral mucosa [No Oral Pallor] : no oral pallor [No Oral Cyanosis] : no oral cyanosis [Normal Jugular Venous A Waves Present] : normal jugular venous A waves present [Normal Jugular Venous V Waves Present] : normal jugular venous V waves present [No Jugular Venous Oliveira A Waves] : no jugular venous oliveira A waves [Respiration, Rhythm And Depth] : normal respiratory rhythm and effort [Exaggerated Use Of Accessory Muscles For Inspiration] : no accessory muscle use [Auscultation Breath Sounds / Voice Sounds] : lungs were clear to auscultation bilaterally [Heart Rate And Rhythm] : heart rate and rhythm were normal [Heart Sounds] : normal S1 and S2 [Abdomen Soft] : soft [Abdomen Tenderness] : non-tender [Abdomen Mass (___ Cm)] : no abdominal mass palpated [Abnormal Walk] : normal gait [Gait - Sufficient For Exercise Testing] : the gait was sufficient for exercise testing [Nail Clubbing] : no clubbing of the fingernails [Cyanosis, Localized] : no localized cyanosis [Petechial Hemorrhages (___cm)] : no petechial hemorrhages [Skin Color & Pigmentation] : normal skin color and pigmentation [] : no rash [No Venous Stasis] : no venous stasis [Skin Lesions] : no skin lesions [No Skin Ulcers] : no skin ulcer [No Xanthoma] : no  xanthoma was observed [Oriented To Time, Place, And Person] : oriented to person, place, and time [Affect] : the affect was normal [Mood] : the mood was normal [No Anxiety] : not feeling anxious [de-identified] : 2/6 TOBY of MR [FreeTextEntry1] : 2/6 TOBY LSB

## 2024-07-29 NOTE — DISCUSSION/SUMMARY
[FreeTextEntry1] : Patient has medical history detailed above and active medical issues including:  - Atypical chest pain resolved, risk coronary calcium score, moderate CAD LAD 50%, moderate nonobstructive CAD with high risk coronary calcium score on CT Oct 2022, cath March 2022 LAD 50% with normal IFR, medical therapy, continue on aspirin, statin, Imdur  - Bileaflet mitral valve prolapse, mild moderate MR, moderate pulmonary hypertension, normal LVEF echo Oct 2022  -Right upper neck cyst seen on carotid Doppler, neck CT ordered  - Hypertension, average resting home BPs at guideline goal on diltiazem, losartan HCTZ.  - Dyslipidemia on simvastatin well tolerated  - LBBB since 2005.  - Pulmonary embolus 2016 completed Xarelto therapy  - Family history of premature vascular disease  Advised patient to follow active lifestyle with regular cardiovascular exercise. Patient educated on lifestyle and diet modification with low sodium low fat diet and avoidance of excessive alcohol. Patient is aware to call with any symptoms or concerns.  Cardiology follow-up 6 months. Current cardiac medications remain unchanged. Repeat labs will be ordered with [facility].  Sina will followup with Dr Alfred Lake for primary care.  Total time spent 45 minutes, reviewing of test results, chart information, patient discussion, physical exam and completion of chart documentation.

## 2024-11-26 ENCOUNTER — TRANSCRIPTION ENCOUNTER (OUTPATIENT)
Age: 84
End: 2024-11-26

## 2024-12-03 ENCOUNTER — OFFICE (OUTPATIENT)
Dept: URBAN - METROPOLITAN AREA CLINIC 38 | Facility: CLINIC | Age: 84
Setting detail: OPHTHALMOLOGY
End: 2024-12-03
Payer: MEDICARE

## 2024-12-03 DIAGNOSIS — H25.13: ICD-10-CM

## 2024-12-03 DIAGNOSIS — H01.004: ICD-10-CM

## 2024-12-03 DIAGNOSIS — D31.40: ICD-10-CM

## 2024-12-03 DIAGNOSIS — H01.001: ICD-10-CM

## 2024-12-03 DIAGNOSIS — H35.373: ICD-10-CM

## 2024-12-03 PROCEDURE — 99213 OFFICE O/P EST LOW 20 MIN: CPT | Performed by: OPHTHALMOLOGY

## 2024-12-03 PROCEDURE — 92134 CPTRZ OPH DX IMG PST SGM RTA: CPT | Performed by: OPHTHALMOLOGY

## 2024-12-03 ASSESSMENT — REFRACTION_CURRENTRX
OS_CYLINDER: -0.75
OS_ADD: +2.75
OD_ADD: +2.75
OS_OVR_VA: 20/
OD_SPHERE: +1.50
OS_SPHERE: +1.50
OD_OVR_VA: 20/
OS_SPHERE: +1.75
OD_VPRISM_DIRECTION: PROGS
OS_CYLINDER: SPHERE
OD_CYLINDER: SPHERE
OS_VPRISM_DIRECTION: PROGS
OS_VPRISM_DIRECTION: PROGS
OD_CYLINDER: SPHERE
OD_OVR_VA: 20/
OD_VPRISM_DIRECTION: PROGS
OD_SPHERE: +1.75
OD_ADD: +3.00
OS_OVR_VA: 20/
OS_ADD: +3.00

## 2024-12-03 ASSESSMENT — REFRACTION_MANIFEST
OS_CYLINDER: -1.00
OS_ADD: +3.00
OU_VA: 20/25-
OS_ADD: +3.00
OD_VA1: 20/25-2
OD_ADD: +3.00
OD_SPHERE: +1.25
OS_VA1: 20/30+2
OS_AXIS: 070
OD_VA1: 20/25-2
OS_VA2: 20/25
OS_AXIS: 070
OS_VA2: 20/25
OD_VA2: 20/25
OU_VA: 20/25-
OS_VA1: 20/30+2
OD_SPHERE: +1.25
OS_SPHERE: +1.75
OD_ADD: +3.00
OD_CYLINDER: SPHERE
OD_CYLINDER: SPHERE
OS_SPHERE: +1.75
OS_CYLINDER: -0.50
OD_VA2: 20/25

## 2024-12-03 ASSESSMENT — REFRACTION_AUTOREFRACTION
OD_CYLINDER: -0.25
OS_AXIS: 099
OD_AXIS: 145
OS_SPHERE: +2.00
OD_SPHERE: +1.50
OS_CYLINDER: -0.25

## 2024-12-03 ASSESSMENT — KERATOMETRY
OD_AXISANGLE_DEGREES: 089
OS_K1POWER_DIOPTERS: 40.75
OD_K2POWER_DIOPTERS: 41.75
OS_AXISANGLE_DEGREES: 075
OS_K2POWER_DIOPTERS: 41.50
OD_K1POWER_DIOPTERS: 41.00
METHOD_AUTO_MANUAL: AUTO

## 2024-12-03 ASSESSMENT — TONOMETRY
OS_IOP_MMHG: 18
OD_IOP_MMHG: 18

## 2024-12-03 ASSESSMENT — CONFRONTATIONAL VISUAL FIELD TEST (CVF)
OS_FINDINGS: FULL
OD_FINDINGS: FULL

## 2024-12-03 ASSESSMENT — LID EXAM ASSESSMENTS
OS_BLEPHARITIS: LUL 2+
OD_BLEPHARITIS: RUL 1+ 2+

## 2024-12-03 ASSESSMENT — VISUAL ACUITY
OD_BCVA: 20/40
OS_BCVA: 20/40-1

## 2024-12-10 DIAGNOSIS — R94.31 ABNORMAL ELECTROCARDIOGRAM [ECG] [EKG]: ICD-10-CM

## 2024-12-16 ENCOUNTER — RX RENEWAL (OUTPATIENT)
Age: 84
End: 2024-12-16

## 2024-12-23 LAB — HBA1C MFR BLD HPLC: 5.8

## 2025-01-13 ENCOUNTER — NON-APPOINTMENT (OUTPATIENT)
Age: 85
End: 2025-01-13

## 2025-01-13 ENCOUNTER — APPOINTMENT (OUTPATIENT)
Dept: CARDIOLOGY | Facility: CLINIC | Age: 85
End: 2025-01-13
Payer: MEDICARE

## 2025-01-13 VITALS
HEIGHT: 67 IN | WEIGHT: 175 LBS | OXYGEN SATURATION: 96 % | BODY MASS INDEX: 27.47 KG/M2 | DIASTOLIC BLOOD PRESSURE: 82 MMHG | HEART RATE: 58 BPM | SYSTOLIC BLOOD PRESSURE: 146 MMHG

## 2025-01-13 DIAGNOSIS — R07.9 CHEST PAIN, UNSPECIFIED: ICD-10-CM

## 2025-01-13 DIAGNOSIS — R00.1 BRADYCARDIA, UNSPECIFIED: ICD-10-CM

## 2025-01-13 DIAGNOSIS — I10 ESSENTIAL (PRIMARY) HYPERTENSION: ICD-10-CM

## 2025-01-13 DIAGNOSIS — I71.40 ABDOMINAL AORTIC ANEURYSM, WITHOUT RUPTURE, UNSPECIFIED: ICD-10-CM

## 2025-01-13 DIAGNOSIS — R94.31 ABNORMAL ELECTROCARDIOGRAM [ECG] [EKG]: ICD-10-CM

## 2025-01-13 DIAGNOSIS — E78.5 HYPERLIPIDEMIA, UNSPECIFIED: ICD-10-CM

## 2025-01-13 DIAGNOSIS — I25.10 ATHEROSCLEROTIC HEART DISEASE OF NATIVE CORONARY ARTERY W/OUT ANGINA PECTORIS: ICD-10-CM

## 2025-01-13 DIAGNOSIS — I44.7 LEFT BUNDLE-BRANCH BLOCK, UNSPECIFIED: ICD-10-CM

## 2025-01-13 PROCEDURE — 99215 OFFICE O/P EST HI 40 MIN: CPT

## 2025-01-13 PROCEDURE — G2211 COMPLEX E/M VISIT ADD ON: CPT

## 2025-01-13 RX ORDER — TERBINAFINE HYDROCHLORIDE 250 MG/1
250 TABLET ORAL DAILY
Refills: 0 | Status: ACTIVE | COMMUNITY

## 2025-02-06 ENCOUNTER — NON-APPOINTMENT (OUTPATIENT)
Age: 85
End: 2025-02-06

## 2025-03-27 DIAGNOSIS — E78.5 HYPERLIPIDEMIA, UNSPECIFIED: ICD-10-CM

## 2025-03-27 DIAGNOSIS — R00.1 BRADYCARDIA, UNSPECIFIED: ICD-10-CM

## 2025-04-10 LAB
APO LP(A) SERPL-MCNC: <10
HBA1C MFR BLD HPLC: 5.8

## 2025-05-19 ENCOUNTER — APPOINTMENT (OUTPATIENT)
Dept: CARDIOLOGY | Facility: CLINIC | Age: 85
End: 2025-05-19
Payer: MEDICARE

## 2025-05-19 VITALS
SYSTOLIC BLOOD PRESSURE: 102 MMHG | HEART RATE: 61 BPM | DIASTOLIC BLOOD PRESSURE: 54 MMHG | WEIGHT: 178 LBS | BODY MASS INDEX: 27.94 KG/M2 | OXYGEN SATURATION: 97 % | HEIGHT: 67 IN

## 2025-05-19 DIAGNOSIS — R07.9 CHEST PAIN, UNSPECIFIED: ICD-10-CM

## 2025-05-19 DIAGNOSIS — I10 ESSENTIAL (PRIMARY) HYPERTENSION: ICD-10-CM

## 2025-05-19 DIAGNOSIS — K21.9 GASTRO-ESOPHAGEAL REFLUX DISEASE W/OUT ESOPHAGITIS: ICD-10-CM

## 2025-05-19 DIAGNOSIS — R94.31 ABNORMAL ELECTROCARDIOGRAM [ECG] [EKG]: ICD-10-CM

## 2025-05-19 DIAGNOSIS — I44.7 LEFT BUNDLE-BRANCH BLOCK, UNSPECIFIED: ICD-10-CM

## 2025-05-19 DIAGNOSIS — I25.10 ATHEROSCLEROTIC HEART DISEASE OF NATIVE CORONARY ARTERY W/OUT ANGINA PECTORIS: ICD-10-CM

## 2025-05-19 DIAGNOSIS — R00.1 BRADYCARDIA, UNSPECIFIED: ICD-10-CM

## 2025-05-19 DIAGNOSIS — I71.40 ABDOMINAL AORTIC ANEURYSM, WITHOUT RUPTURE, UNSPECIFIED: ICD-10-CM

## 2025-05-19 DIAGNOSIS — E78.5 HYPERLIPIDEMIA, UNSPECIFIED: ICD-10-CM

## 2025-05-19 PROCEDURE — 93306 TTE W/DOPPLER COMPLETE: CPT

## 2025-05-19 PROCEDURE — 99215 OFFICE O/P EST HI 40 MIN: CPT

## 2025-06-03 ENCOUNTER — OFFICE (OUTPATIENT)
Dept: URBAN - METROPOLITAN AREA CLINIC 38 | Facility: CLINIC | Age: 85
Setting detail: OPHTHALMOLOGY
End: 2025-06-03
Payer: MEDICARE

## 2025-06-03 DIAGNOSIS — D31.40: ICD-10-CM

## 2025-06-03 DIAGNOSIS — H01.004: ICD-10-CM

## 2025-06-03 DIAGNOSIS — H25.13: ICD-10-CM

## 2025-06-03 DIAGNOSIS — H01.001: ICD-10-CM

## 2025-06-03 DIAGNOSIS — H43.813: ICD-10-CM

## 2025-06-03 DIAGNOSIS — H35.373: ICD-10-CM

## 2025-06-03 DIAGNOSIS — H43.393: ICD-10-CM

## 2025-06-03 DIAGNOSIS — H11.151: ICD-10-CM

## 2025-06-03 DIAGNOSIS — Q14.1: ICD-10-CM

## 2025-06-03 PROCEDURE — 92014 COMPRE OPH EXAM EST PT 1/>: CPT | Performed by: OPHTHALMOLOGY

## 2025-06-03 ASSESSMENT — REFRACTION_MANIFEST
OS_VA2: 20/25
OD_VA1: 20/25-2
OD_CYLINDER: SPHERE
OD_ADD: +3.00
OS_VA1: 20/30+
OS_ADD: +3.00
OD_VA2: 20/25
OD_VA2: 20/25
OS_SPHERE: +2.00
OD_SPHERE: +1.25
OS_AXIS: 070
OD_SPHERE: +1.50
OD_ADD: +3.00
OD_VA1: 20/25+
OU_VA: 20/25
OU_VA: 20/25-
OS_CYLINDER: -0.50
OS_VA1: 20/30+2
OS_VA2: 20/25
OS_AXIS: 070
OS_CYLINDER: -0.75
OD_AXIS: 015
OS_SPHERE: +1.75
OD_CYLINDER: -0.25
OS_ADD: +3.00

## 2025-06-03 ASSESSMENT — REFRACTION_CURRENTRX
OS_CYLINDER: SPHERE
OS_CYLINDER: -0.50
OS_ADD: +3.00
OD_SPHERE: +1.75
OS_SPHERE: +2.00
OS_OVR_VA: 20/
OD_CYLINDER: SPHERE
OS_VPRISM_DIRECTION: PROGS
OS_OVR_VA: 20/
OD_VPRISM_DIRECTION: PROGS
OD_VPRISM_DIRECTION: PROGS
OS_ADD: +2.50
OD_OVR_VA: 20/
OD_ADD: +3.00
OD_SPHERE: +1.25
OD_ADD: +2.50
OS_SPHERE: +1.50
OS_VPRISM_DIRECTION: PROGS
OD_OVR_VA: 20/
OS_AXIS: 064

## 2025-06-03 ASSESSMENT — KERATOMETRY
METHOD_AUTO_MANUAL: AUTO
OD_K2POWER_DIOPTERS: 42.25
OD_AXISANGLE_DEGREES: 087
OS_AXISANGLE_DEGREES: 077
OD_K1POWER_DIOPTERS: 40.75
OS_K2POWER_DIOPTERS: 41.50
OS_K1POWER_DIOPTERS: 40.75

## 2025-06-03 ASSESSMENT — REFRACTION_AUTOREFRACTION
OS_AXIS: 137
OS_SPHERE: +2.00
OD_AXIS: 015
OS_CYLINDER: -0.50
OD_SPHERE: +1.00
OD_CYLINDER: -1.00

## 2025-06-03 ASSESSMENT — CONFRONTATIONAL VISUAL FIELD TEST (CVF)
OD_FINDINGS: FULL
OS_FINDINGS: FULL

## 2025-06-03 ASSESSMENT — VISUAL ACUITY
OD_BCVA: 20/25
OS_BCVA: 20/25-2

## 2025-06-03 ASSESSMENT — LID EXAM ASSESSMENTS
OD_BLEPHARITIS: RUL 1+ 2+
OS_BLEPHARITIS: LUL 2+

## 2025-07-09 ENCOUNTER — APPOINTMENT (OUTPATIENT)
Dept: CARDIOLOGY | Facility: CLINIC | Age: 85
End: 2025-07-09

## 2025-07-10 ENCOUNTER — APPOINTMENT (OUTPATIENT)
Dept: CARDIOLOGY | Facility: CLINIC | Age: 85
End: 2025-07-10

## 2025-07-14 ENCOUNTER — NON-APPOINTMENT (OUTPATIENT)
Age: 85
End: 2025-07-14

## 2025-07-17 ENCOUNTER — APPOINTMENT (OUTPATIENT)
Dept: CARDIOLOGY | Facility: CLINIC | Age: 85
End: 2025-07-17
Payer: MEDICARE

## 2025-07-17 VITALS
SYSTOLIC BLOOD PRESSURE: 120 MMHG | OXYGEN SATURATION: 97 % | DIASTOLIC BLOOD PRESSURE: 60 MMHG | WEIGHT: 181 LBS | BODY MASS INDEX: 28.35 KG/M2 | HEART RATE: 61 BPM

## 2025-07-17 PROCEDURE — 99215 OFFICE O/P EST HI 40 MIN: CPT

## 2025-07-17 RX ORDER — DILTIAZEM HYDROCHLORIDE 180 MG/1
180 CAPSULE, EXTENDED RELEASE ORAL
Qty: 90 | Refills: 3 | Status: ACTIVE | COMMUNITY
Start: 2025-07-17 | End: 1900-01-01

## 2025-07-28 ENCOUNTER — APPOINTMENT (OUTPATIENT)
Dept: CARDIOLOGY | Facility: CLINIC | Age: 85
End: 2025-07-28
Payer: MEDICARE

## 2025-07-28 PROCEDURE — 93015 CV STRESS TEST SUPVJ I&R: CPT

## 2025-07-28 PROCEDURE — 78452 HT MUSCLE IMAGE SPECT MULT: CPT

## 2025-07-28 PROCEDURE — A9502: CPT | Mod: JZ

## 2025-08-04 ENCOUNTER — APPOINTMENT (OUTPATIENT)
Dept: CARDIOLOGY | Facility: CLINIC | Age: 85
End: 2025-08-04
Payer: MEDICARE

## 2025-08-04 DIAGNOSIS — K21.9 GASTRO-ESOPHAGEAL REFLUX DISEASE W/OUT ESOPHAGITIS: ICD-10-CM

## 2025-08-04 DIAGNOSIS — R94.31 ABNORMAL ELECTROCARDIOGRAM [ECG] [EKG]: ICD-10-CM

## 2025-08-04 DIAGNOSIS — I25.10 ATHEROSCLEROTIC HEART DISEASE OF NATIVE CORONARY ARTERY W/OUT ANGINA PECTORIS: ICD-10-CM

## 2025-08-04 DIAGNOSIS — I44.7 LEFT BUNDLE-BRANCH BLOCK, UNSPECIFIED: ICD-10-CM

## 2025-08-04 DIAGNOSIS — I10 ESSENTIAL (PRIMARY) HYPERTENSION: ICD-10-CM

## 2025-08-04 DIAGNOSIS — R07.9 CHEST PAIN, UNSPECIFIED: ICD-10-CM

## 2025-08-04 DIAGNOSIS — R00.1 BRADYCARDIA, UNSPECIFIED: ICD-10-CM

## 2025-08-04 DIAGNOSIS — I71.40 ABDOMINAL AORTIC ANEURYSM, WITHOUT RUPTURE, UNSPECIFIED: ICD-10-CM

## 2025-08-04 DIAGNOSIS — E78.5 HYPERLIPIDEMIA, UNSPECIFIED: ICD-10-CM

## 2025-08-04 PROCEDURE — 99213 OFFICE O/P EST LOW 20 MIN: CPT | Mod: 93
